# Patient Record
Sex: MALE | Race: WHITE | Employment: OTHER | URBAN - METROPOLITAN AREA
[De-identification: names, ages, dates, MRNs, and addresses within clinical notes are randomized per-mention and may not be internally consistent; named-entity substitution may affect disease eponyms.]

---

## 2017-05-16 ENCOUNTER — TELEPHONE (OUTPATIENT)
Dept: INTERNAL MEDICINE CLINIC | Facility: CLINIC | Age: 82
End: 2017-05-16

## 2017-05-16 NOTE — TELEPHONE ENCOUNTER
Pt calling to refill on below medicine below. Pt states he request refill and he do not go thru pharmacy. Please advise. Current outpatient prescriptions:   •  gabapentin 600 MG Oral Tab, Take 1 tablet (600 mg total) by mouth daily. , Disp: 90 tablet,

## 2017-05-23 RX ORDER — GABAPENTIN 600 MG/1
TABLET ORAL
Qty: 90 TABLET | Refills: 2 | Status: SHIPPED | OUTPATIENT
Start: 2017-05-23 | End: 2017-12-16

## 2017-06-22 ENCOUNTER — OFFICE VISIT (OUTPATIENT)
Dept: INTERNAL MEDICINE CLINIC | Facility: CLINIC | Age: 82
End: 2017-06-22

## 2017-06-22 ENCOUNTER — LAB ENCOUNTER (OUTPATIENT)
Dept: LAB | Facility: HOSPITAL | Age: 82
End: 2017-06-22
Attending: INTERNAL MEDICINE
Payer: MEDICARE

## 2017-06-22 ENCOUNTER — PRIOR ORIGINAL RECORDS (OUTPATIENT)
Dept: OTHER | Age: 82
End: 2017-06-22

## 2017-06-22 VITALS
HEART RATE: 60 BPM | DIASTOLIC BLOOD PRESSURE: 88 MMHG | BODY MASS INDEX: 26 KG/M2 | WEIGHT: 172 LBS | SYSTOLIC BLOOD PRESSURE: 128 MMHG | RESPIRATION RATE: 16 BRPM

## 2017-06-22 DIAGNOSIS — E78.2 MIXED HYPERLIPIDEMIA: ICD-10-CM

## 2017-06-22 DIAGNOSIS — R97.20 ELEVATED PSA: ICD-10-CM

## 2017-06-22 DIAGNOSIS — I10 ESSENTIAL HYPERTENSION: ICD-10-CM

## 2017-06-22 DIAGNOSIS — I10 ESSENTIAL HYPERTENSION: Primary | ICD-10-CM

## 2017-06-22 PROCEDURE — 80053 COMPREHEN METABOLIC PANEL: CPT

## 2017-06-22 PROCEDURE — 84153 ASSAY OF PSA TOTAL: CPT

## 2017-06-22 PROCEDURE — G0463 HOSPITAL OUTPT CLINIC VISIT: HCPCS | Performed by: INTERNAL MEDICINE

## 2017-06-22 PROCEDURE — 36415 COLL VENOUS BLD VENIPUNCTURE: CPT

## 2017-06-22 PROCEDURE — 99214 OFFICE O/P EST MOD 30 MIN: CPT | Performed by: INTERNAL MEDICINE

## 2017-06-22 PROCEDURE — 84443 ASSAY THYROID STIM HORMONE: CPT

## 2017-06-22 PROCEDURE — 80061 LIPID PANEL: CPT

## 2017-06-22 PROCEDURE — 85025 COMPLETE CBC W/AUTO DIFF WBC: CPT

## 2017-06-22 PROCEDURE — 81003 URINALYSIS AUTO W/O SCOPE: CPT

## 2017-06-22 NOTE — PROGRESS NOTES
Herb Mtz is a [de-identified]year old male. HPI:   1. Essential hypertension with goal blood pressure less than 140/90    Patient presents for recheck of his hypertension.  Pt has been taking medications as instructed, no medication side effects, home BP macho Rfl: 0   Cyanocobalamin (VITAMIN B-12 ER) 2000 MCG Oral Tab CR Take  by mouth. Disp:  Rfl:    Doxazosin Mesylate (CARDURA) 4 MG Oral Tab Take 4 mg by mouth. Disp:  Rfl:    aspirin 81 MG Oral Tab Take 81 mg by mouth daily.    Disp:  Rfl:    DiphenhydrAMINE Right     Comment Carpal Tunnel Surgery    OTHER Left     Comment Carpal Tunnel  Surgey      Social History:      Smoking Status: Never Smoker                      Smokeless Status: Never Used                        Alcohol Use: Yes           29.4 oz/week [E]; Future    3. Elevated PSA    Had a recent PSA that was 12.52 that had even been higher previously to 9.9 in May  of this year. Has been following this level for years. - PSA (Monitoring/Diagnostic) [E];  Future    The patient indicates understandin

## 2017-06-24 NOTE — PROGRESS NOTES
Per EMR PSA done 8-11-16 was 5.7 , now 11.4  Dr BOWLING, does pt need to see or f/u with urology? pls advise, thanks in advance.

## 2017-06-26 ENCOUNTER — TELEPHONE (OUTPATIENT)
Dept: INTERNAL MEDICINE CLINIC | Facility: CLINIC | Age: 82
End: 2017-06-26

## 2017-06-26 NOTE — TELEPHONE ENCOUNTER
----- Message from Gabriel Gomes MD sent at 6/25/2017  4:04 AM CDT -----  Patient has been seeing Dr Juancarlos Wright a urologist in [facility] for years and recently was evaluated by him.  Send patient value of PSA and he will contact Dr Nancy Tijerina regarding th

## 2017-06-27 NOTE — TELEPHONE ENCOUNTER
Patient aware of his results. Patient will contact and update Dr. Nancy Tijerina (URO). Does have an appt 7/25/17 to see Dr. Nancy Tijerina.

## 2017-08-01 ENCOUNTER — MYAURORA ACCOUNT LINK (OUTPATIENT)
Dept: OTHER | Age: 82
End: 2017-08-01

## 2017-08-01 ENCOUNTER — PRIOR ORIGINAL RECORDS (OUTPATIENT)
Dept: OTHER | Age: 82
End: 2017-08-01

## 2017-08-02 ENCOUNTER — PRIOR ORIGINAL RECORDS (OUTPATIENT)
Dept: OTHER | Age: 82
End: 2017-08-02

## 2017-08-04 ENCOUNTER — PRIOR ORIGINAL RECORDS (OUTPATIENT)
Dept: OTHER | Age: 82
End: 2017-08-04

## 2017-08-14 LAB
ALBUMIN: 3.9 G/DL
ALKALINE PHOSPHATATE(ALK PHOS): 65 IU/L
ALT (SGPT): 23 U/L
AST (SGOT): 20 U/L
BILIRUBIN TOTAL: 1.8 MG/DL
BUN: 16 MG/DL
CALCIUM: 9.3 MG/DL
CHLORIDE: 106 MEQ/L
CHOLESTEROL, TOTAL: 162 MG/DL
CREATININE, SERUM: 1.09 MG/DL
GLOBULIN: 2.4 G/DL
GLUCOSE: 104 MG/DL
GLUCOSE: 104 MG/DL
HDL CHOLESTEROL: 72 MG/DL
HEMATOCRIT: 40.8 %
HEMOGLOBIN: 13.6 G/DL
LDL CHOLESTEROL: 80 MG/DL
NON-HDL CHOLESTEROL: 90 MG/DL
PLATELETS: 188 K/UL
POTASSIUM, SERUM: 4.5 MEQ/L
PROTEIN, TOTAL: 6.3 G/DL
RED BLOOD COUNT: 4.33 X 10-6/U
SGOT (AST): 20 IU/L
SGPT (ALT): 23 IU/L
SODIUM: 141 MEQ/L
THYROID STIMULATING HORMONE: 1.58 MLU/L
TRIGLYCERIDES: 50 MG/DL
WHITE BLOOD COUNT: 3.6 X 10-3/U

## 2017-08-15 ENCOUNTER — PRIOR ORIGINAL RECORDS (OUTPATIENT)
Dept: OTHER | Age: 82
End: 2017-08-15

## 2017-09-14 ENCOUNTER — MED REC SCAN ONLY (OUTPATIENT)
Dept: INTERNAL MEDICINE CLINIC | Facility: CLINIC | Age: 82
End: 2017-09-14

## 2017-12-18 RX ORDER — GABAPENTIN 600 MG/1
TABLET ORAL
Qty: 90 TABLET | Refills: 0 | Status: SHIPPED | OUTPATIENT
Start: 2017-12-18 | End: 2018-05-08

## 2017-12-22 ENCOUNTER — HOSPITAL ENCOUNTER (OUTPATIENT)
Age: 82
Discharge: HOME | End: 2017-12-22
Attending: EMERGENCY MEDICINE
Payer: MEDICARE

## 2017-12-22 VITALS
DIASTOLIC BLOOD PRESSURE: 53 MMHG | SYSTOLIC BLOOD PRESSURE: 131 MMHG | BODY MASS INDEX: 25.01 KG/M2 | HEART RATE: 62 BPM | HEIGHT: 68 IN | OXYGEN SATURATION: 95 % | RESPIRATION RATE: 18 BRPM | WEIGHT: 165 LBS | TEMPERATURE: 98 F

## 2017-12-22 DIAGNOSIS — J40 BRONCHITIS: Primary | ICD-10-CM

## 2017-12-22 PROCEDURE — 99214 OFFICE O/P EST MOD 30 MIN: CPT

## 2017-12-22 PROCEDURE — 99213 OFFICE O/P EST LOW 20 MIN: CPT

## 2017-12-22 RX ORDER — AZITHROMYCIN 250 MG/1
TABLET, FILM COATED ORAL
Qty: 1 PACKAGE | Refills: 0 | Status: SHIPPED | OUTPATIENT
Start: 2017-12-22 | End: 2017-12-27

## 2017-12-22 NOTE — ED PROVIDER NOTES
Patient presents with:  Cough/URI      HPI:     Emory Kelly is a [de-identified]year old male who presents for evaluation of a chief complaint of  a cough Onset of symptoms was 4 days ago.  The patient also complains of none, the patient denies fever chest pain or b discussion with the patient he would prefer empiric antibiotic treatment and will defer on x-rays at this time      Orders Placed This Encounter      azithromycin (ZITHROMAX Z-KAM) 250 MG Oral Tab          Si mg once followed by 250 mg daily x 4 days

## 2018-05-09 RX ORDER — GABAPENTIN 600 MG/1
TABLET ORAL
Qty: 90 TABLET | Refills: 0 | Status: SHIPPED | OUTPATIENT
Start: 2018-05-09 | End: 2018-08-21

## 2018-06-08 ENCOUNTER — PATIENT MESSAGE (OUTPATIENT)
Dept: INTERNAL MEDICINE CLINIC | Facility: CLINIC | Age: 83
End: 2018-06-08

## 2018-06-19 DIAGNOSIS — Z12.5 SCREENING FOR PROSTATE CANCER: ICD-10-CM

## 2018-06-19 DIAGNOSIS — I10 ESSENTIAL HYPERTENSION WITH GOAL BLOOD PRESSURE LESS THAN 140/90: Primary | ICD-10-CM

## 2018-06-21 ENCOUNTER — HOSPITAL ENCOUNTER (EMERGENCY)
Facility: HOSPITAL | Age: 83
Discharge: HOME | End: 2018-06-21
Attending: EMERGENCY MEDICINE
Payer: MEDICARE

## 2018-06-21 VITALS
OXYGEN SATURATION: 100 % | TEMPERATURE: 97 F | HEART RATE: 64 BPM | BODY MASS INDEX: 25.76 KG/M2 | SYSTOLIC BLOOD PRESSURE: 180 MMHG | DIASTOLIC BLOOD PRESSURE: 90 MMHG | RESPIRATION RATE: 24 BRPM | HEIGHT: 68 IN | WEIGHT: 170 LBS

## 2018-06-21 DIAGNOSIS — Z46.6 ENCOUNTER FOR FOLEY CATHETER REMOVAL: Primary | ICD-10-CM

## 2018-06-21 PROCEDURE — 99282 EMERGENCY DEPT VISIT SF MDM: CPT

## 2018-06-21 NOTE — ED INITIAL ASSESSMENT (HPI)
Pt had chavarria catheter placed last week following biopsy but pt is having discomfort and would like the catheter removed.  Has appointment with doctor tomorrow but cannot wait

## 2018-06-22 ENCOUNTER — HOSPITAL ENCOUNTER (OUTPATIENT)
Dept: CT IMAGING | Facility: HOSPITAL | Age: 83
Discharge: HOME | End: 2018-06-22
Attending: UROLOGY
Payer: MEDICARE

## 2018-06-22 DIAGNOSIS — C61 PROSTATE CA (HCC): ICD-10-CM

## 2018-06-22 PROCEDURE — 74177 CT ABD & PELVIS W/CONTRAST: CPT | Performed by: UROLOGY

## 2018-06-26 ENCOUNTER — PRIOR ORIGINAL RECORDS (OUTPATIENT)
Dept: OTHER | Age: 83
End: 2018-06-26

## 2018-06-26 ENCOUNTER — HOSPITAL ENCOUNTER (OUTPATIENT)
Dept: NUCLEAR MEDICINE | Facility: HOSPITAL | Age: 83
Discharge: HOME | End: 2018-06-26
Attending: UROLOGY
Payer: MEDICARE

## 2018-06-26 ENCOUNTER — LAB ENCOUNTER (OUTPATIENT)
Dept: LAB | Facility: HOSPITAL | Age: 83
End: 2018-06-26
Attending: UROLOGY
Payer: MEDICARE

## 2018-06-26 DIAGNOSIS — I10 ESSENTIAL HYPERTENSION WITH GOAL BLOOD PRESSURE LESS THAN 140/90: ICD-10-CM

## 2018-06-26 DIAGNOSIS — C61 PROSTATE CA (HCC): ICD-10-CM

## 2018-06-26 PROCEDURE — 85025 COMPLETE CBC W/AUTO DIFF WBC: CPT

## 2018-06-26 PROCEDURE — 80053 COMPREHEN METABOLIC PANEL: CPT

## 2018-06-26 PROCEDURE — 78306 BONE IMAGING WHOLE BODY: CPT | Performed by: UROLOGY

## 2018-06-26 PROCEDURE — 84443 ASSAY THYROID STIM HORMONE: CPT

## 2018-06-26 PROCEDURE — 81001 URINALYSIS AUTO W/SCOPE: CPT

## 2018-06-26 PROCEDURE — 36415 COLL VENOUS BLD VENIPUNCTURE: CPT

## 2018-06-26 PROCEDURE — 80061 LIPID PANEL: CPT

## 2018-07-09 ENCOUNTER — OFFICE VISIT (OUTPATIENT)
Dept: INTERNAL MEDICINE CLINIC | Facility: CLINIC | Age: 83
End: 2018-07-09

## 2018-07-09 VITALS
HEIGHT: 68 IN | DIASTOLIC BLOOD PRESSURE: 81 MMHG | BODY MASS INDEX: 26.67 KG/M2 | SYSTOLIC BLOOD PRESSURE: 138 MMHG | HEART RATE: 76 BPM | RESPIRATION RATE: 16 BRPM | WEIGHT: 176 LBS

## 2018-07-09 DIAGNOSIS — E78.2 MIXED HYPERLIPIDEMIA: ICD-10-CM

## 2018-07-09 DIAGNOSIS — C61 PROSTATE CANCER (HCC): ICD-10-CM

## 2018-07-09 DIAGNOSIS — I10 ESSENTIAL HYPERTENSION WITH GOAL BLOOD PRESSURE LESS THAN 140/90: Primary | ICD-10-CM

## 2018-07-09 PROCEDURE — 99214 OFFICE O/P EST MOD 30 MIN: CPT | Performed by: INTERNAL MEDICINE

## 2018-07-09 PROCEDURE — G0463 HOSPITAL OUTPT CLINIC VISIT: HCPCS | Performed by: INTERNAL MEDICINE

## 2018-07-09 RX ORDER — METOPROLOL TARTRATE 50 MG/1
TABLET, FILM COATED ORAL
COMMUNITY
End: 2018-07-09

## 2018-07-09 RX ORDER — DOXAZOSIN MESYLATE 4 MG/1
TABLET ORAL
COMMUNITY
End: 2018-07-09

## 2018-07-09 RX ORDER — ATORVASTATIN CALCIUM 40 MG/1
TABLET, FILM COATED ORAL
COMMUNITY

## 2018-07-09 NOTE — PROGRESS NOTES
Sariah Barajas is a 82year old male. HPI:   1. Essential hypertension with goal blood pressure less than 140/90    Patient presents for recheck of his hypertension.  Pt has been taking medications as instructed, no medication side effects, home BP macho oral route. Disp:  Rfl:    GABAPENTIN 600 MG Oral Tab TAKE ONE TABLET BY MOUTH ONE TIME DAILY  Disp: 90 tablet Rfl: 0   DiphenhydrAMINE HCl (BENADRYL) 25 MG Oral Cap Take 25 mg by mouth every 6 (six) hours as needed for Itching.  Disp:  Rfl:    Metoprolol T Comment: Procedure: CARPAL TUNNEL RELEASE;  Surgeon:                Barbra Yee MD;  Location: [place] [address]  1/5/2016: REVISE MEDIAN N/CARPAL TUNNEL SURG Right      Comment: Procedure: CARPAL TUNNEL RELEASE;  Surgeon:                Barbra Yee MD; to exercise at least 3 times weekly to build strength, burn calories and help to achieve ideal body weight. 3. Prostate cancer [facility])    To see urology for injection starting tomorrow and every 3 months to help control cancer. Has been seeing Dr Joy Cannon.

## 2018-07-12 ENCOUNTER — HOSPITAL ENCOUNTER (OUTPATIENT)
Age: 83
Discharge: HOME | End: 2018-07-12
Attending: FAMILY MEDICINE
Payer: MEDICARE

## 2018-07-12 VITALS
BODY MASS INDEX: 25.76 KG/M2 | SYSTOLIC BLOOD PRESSURE: 143 MMHG | OXYGEN SATURATION: 100 % | RESPIRATION RATE: 18 BRPM | HEART RATE: 55 BPM | DIASTOLIC BLOOD PRESSURE: 59 MMHG | HEIGHT: 68 IN | WEIGHT: 170 LBS | TEMPERATURE: 98 F

## 2018-07-12 DIAGNOSIS — H61.21 IMPACTED CERUMEN OF RIGHT EAR: Primary | ICD-10-CM

## 2018-07-12 PROCEDURE — 99212 OFFICE O/P EST SF 10 MIN: CPT

## 2018-07-12 PROCEDURE — 69209 REMOVE IMPACTED EAR WAX UNI: CPT

## 2018-07-12 PROCEDURE — 99213 OFFICE O/P EST LOW 20 MIN: CPT

## 2018-07-12 NOTE — ED PROVIDER NOTES
Patient Seen in: [address] [facility]    History   Patient presents with:  Ear Problem    Stated Complaint: CLOGGED EARS     HPI    R ear clogged   Went to see audiologist who recommended pt get his ear cleaned.    Notes slight decr Location: [facility]    Family history reviewed and is not pertinent to presenting problem.     Smoking status: Never Smoker                                                              Smokeless tobacco: Never Used                      Alcohol use: Yes

## 2018-07-12 NOTE — ED INITIAL ASSESSMENT (HPI)
PATIENT ARRIVED AMBULATORY TO ROOM C/O MUFFLED HEARING TO THE RIGHT EAR. PATIENT STATES \"i THINK THERE IS WAX BUILD UP\" NO PAIN. NO FEVERS. NO NASAL CONGESTION/COUGH.

## 2018-08-03 LAB
ALBUMIN: 3.8 G/DL
ALKALINE PHOSPHATATE(ALK PHOS): 85 IU/L
ALT (SGPT): 24 U/L
AST (SGOT): 26 U/L
BILIRUBIN TOTAL: 1.3 MG/DL
BUN: 14 MG/DL
CALCIUM: 9.3 MG/DL
CHLORIDE: 106 MEQ/L
CHOLESTEROL, TOTAL: 159 MG/DL
CREATININE, SERUM: 1.14 MG/DL
GLOBULIN: 2.5 G/DL
GLUCOSE: 102 MG/DL
GLUCOSE: 102 MG/DL
HDL CHOLESTEROL: 68 MG/DL
HEMATOCRIT: 39.2 %
HEMOGLOBIN: 13.3 G/DL
LDL CHOLESTEROL: 82 MG/DL
NON-HDL CHOLESTEROL: 91 MG/DL
PLATELETS: 199 K/UL
POTASSIUM, SERUM: 4.4 MEQ/L
PROTEIN, TOTAL: 6.3 G/DL
RED BLOOD COUNT: 4.15 X 10-6/U
SGOT (AST): 26 IU/L
SGPT (ALT): 24 IU/L
SODIUM: 140 MEQ/L
THYROID STIMULATING HORMONE: 0.96 MLU/L
TRIGLYCERIDES: 46 MG/DL
WHITE BLOOD COUNT: 3.6 X 10-3/U

## 2018-08-07 ENCOUNTER — PRIOR ORIGINAL RECORDS (OUTPATIENT)
Dept: OTHER | Age: 83
End: 2018-08-07

## 2018-08-07 ENCOUNTER — MYAURORA ACCOUNT LINK (OUTPATIENT)
Dept: OTHER | Age: 83
End: 2018-08-07

## 2018-08-14 ENCOUNTER — TELEPHONE (OUTPATIENT)
Dept: INTERNAL MEDICINE CLINIC | Facility: CLINIC | Age: 83
End: 2018-08-14

## 2018-08-14 NOTE — TELEPHONE ENCOUNTER
Pharmacy requesting written rx for [facility] (Zoster Vaccine - Recombinant, Adjuvanted)? .     Patient on chemo and needs written RX.        Please advise

## 2018-08-16 NOTE — TELEPHONE ENCOUNTER
Looked it  up and it is contraindicated to get the shingrex  vaccine when on chemo he will need to get the prescription from his oncologist or  PCP after he is done with chemo

## 2018-08-17 ENCOUNTER — HOSPITAL ENCOUNTER (OUTPATIENT)
Age: 83
Discharge: HOME | End: 2018-08-17
Attending: EMERGENCY MEDICINE
Payer: MEDICARE

## 2018-08-17 ENCOUNTER — APPOINTMENT (OUTPATIENT)
Dept: GENERAL RADIOLOGY | Age: 83
End: 2018-08-17
Attending: EMERGENCY MEDICINE
Payer: MEDICARE

## 2018-08-17 VITALS
HEIGHT: 68 IN | TEMPERATURE: 98 F | RESPIRATION RATE: 18 BRPM | HEART RATE: 58 BPM | OXYGEN SATURATION: 97 % | DIASTOLIC BLOOD PRESSURE: 48 MMHG | WEIGHT: 170 LBS | SYSTOLIC BLOOD PRESSURE: 134 MMHG | BODY MASS INDEX: 25.76 KG/M2

## 2018-08-17 DIAGNOSIS — S96.911A RIGHT FOOT STRAIN, INITIAL ENCOUNTER: Primary | ICD-10-CM

## 2018-08-17 PROCEDURE — 73630 X-RAY EXAM OF FOOT: CPT | Performed by: EMERGENCY MEDICINE

## 2018-08-17 PROCEDURE — 99213 OFFICE O/P EST LOW 20 MIN: CPT

## 2018-08-17 NOTE — ED NOTES
Patient presents with c/o right foot pain that started 1 week ago and seems to be getting worse. Patient denies any fall or injury but thinks he may have twisted while walking. No bruising or deformity noted.  Patient had an ace wrap in place upon arrival.

## 2018-08-17 NOTE — ED PROVIDER NOTES
Patient Seen in: [facility]    History   Patient presents with:  Lower Extremity Injury (musculoskeletal)    Stated Complaint: Foot Pain    HPI  Complains of pain in the right foot.   Predominantly on the top and towards t Location: [facility]  12/23/2015: PATIENT Whitley Clare PREOPERATIVE ORDER FOR IV ANT*      Comment: Procedure: ;  Surgeon: Pippa Ferreira MD;                 Location: [facility]  1/5/2016: PATIENT Whitley Sparks PREOPERATIVE ORDER FOR IV ANT* Cardiovascular: Normal rate, regular rhythm, normal heart sounds and intact distal pulses. Pulmonary/Chest: Effort normal and breath sounds normal.   Abdominal: Soft. There is no tenderness. Musculoskeletal: Normal range of motion.  He exhibits no christophe Results the x-ray were discussed with the patient. Follow-up, return and home management were reviewed. He states his primary care doctor is out of town currently.   He was given the name of a podiatrist as an option for follow-up as well as following up

## 2018-08-21 ENCOUNTER — OFFICE VISIT (OUTPATIENT)
Dept: PODIATRY CLINIC | Facility: CLINIC | Age: 83
End: 2018-08-21
Payer: MEDICARE

## 2018-08-21 DIAGNOSIS — M79.671 RIGHT FOOT PAIN: Primary | ICD-10-CM

## 2018-08-21 PROCEDURE — 99203 OFFICE O/P NEW LOW 30 MIN: CPT | Performed by: PODIATRIST

## 2018-08-21 PROCEDURE — G0463 HOSPITAL OUTPT CLINIC VISIT: HCPCS | Performed by: PODIATRIST

## 2018-08-21 RX ORDER — GABAPENTIN 600 MG/1
TABLET ORAL
Qty: 90 TABLET | Refills: 0 | Status: SHIPPED | OUTPATIENT
Start: 2018-08-21 | End: 2018-10-30

## 2018-08-21 NOTE — TELEPHONE ENCOUNTER
Neurology Medications Passed8/21 1:07 PM   Appointment in the past 6 or next 3 months     Medication refilled for 90 days per protocol.

## 2018-08-21 NOTE — PROGRESS NOTES
HPI:    Patient ID: Andre Faith is a 82year old male. HPI  This pleasant 37-year-old male presents as a new patient to me on referral from the urgent care center.   Patient states for the last 10 days he has had frustrating pain associated with the r There is no evidence of neurologic deficit there is no apparent weakness. I did review x-ray and there was some question in reference to the first metatarsophalangeal joint.   Patient states that he has never had gout and presently has no symptoms around t

## 2018-08-22 ENCOUNTER — TELEPHONE (OUTPATIENT)
Dept: INTERNAL MEDICINE CLINIC | Facility: CLINIC | Age: 83
End: 2018-08-22

## 2018-08-22 NOTE — TELEPHONE ENCOUNTER
Pt called in requesting to speak with you directly in regards to receiving the Shingrix vaccination.

## 2018-10-31 RX ORDER — GABAPENTIN 600 MG/1
TABLET ORAL
Qty: 90 TABLET | Refills: 0 | Status: SHIPPED | OUTPATIENT
Start: 2018-10-31 | End: 2018-12-11

## 2018-11-01 NOTE — TELEPHONE ENCOUNTER
Refill passed per [facility], [facility] protocol.     Refill Protocol Appointment Criteria  · Appointment scheduled in the past 6 months or in the next 3 months  Recent Outpatient Visits            2 months ago Right foot pain    [facility]

## 2018-11-19 ENCOUNTER — OFFICE VISIT (OUTPATIENT)
Dept: INTERNAL MEDICINE CLINIC | Facility: CLINIC | Age: 83
End: 2018-11-19
Payer: MEDICARE

## 2018-11-19 VITALS
DIASTOLIC BLOOD PRESSURE: 74 MMHG | BODY MASS INDEX: 26.37 KG/M2 | HEART RATE: 56 BPM | RESPIRATION RATE: 16 BRPM | SYSTOLIC BLOOD PRESSURE: 136 MMHG | HEIGHT: 68 IN | WEIGHT: 174 LBS

## 2018-11-19 DIAGNOSIS — E78.2 MIXED HYPERLIPIDEMIA: ICD-10-CM

## 2018-11-19 DIAGNOSIS — C61 PROSTATE CANCER (HCC): ICD-10-CM

## 2018-11-19 DIAGNOSIS — I10 ESSENTIAL HYPERTENSION WITH GOAL BLOOD PRESSURE LESS THAN 140/90: Primary | ICD-10-CM

## 2018-11-19 PROCEDURE — G0463 HOSPITAL OUTPT CLINIC VISIT: HCPCS | Performed by: INTERNAL MEDICINE

## 2018-11-19 PROCEDURE — 99214 OFFICE O/P EST MOD 30 MIN: CPT | Performed by: INTERNAL MEDICINE

## 2018-12-11 RX ORDER — GABAPENTIN 600 MG/1
TABLET ORAL
Qty: 90 TABLET | Refills: 0 | Status: SHIPPED | OUTPATIENT
Start: 2018-12-11 | End: 2019-04-24

## 2018-12-12 NOTE — TELEPHONE ENCOUNTER
Refill passed per [address] Olga Vickers protocol.   Refill Protocol Appointment Criteria  · Appointment scheduled in the past 6 months or in the next 3 months  Recent Outpatient Visits            3 weeks ago Essential hypertension with goal blood pressure less th

## 2018-12-15 ENCOUNTER — TELEPHONE (OUTPATIENT)
Dept: INTERNAL MEDICINE CLINIC | Facility: CLINIC | Age: 83
End: 2018-12-15

## 2018-12-15 NOTE — TELEPHONE ENCOUNTER
Pt requesting orders for Shingrix vaccination be sent to [facility] in [place]. Please call back with confirmation once sent.

## 2018-12-19 ENCOUNTER — HOSPITAL ENCOUNTER (OUTPATIENT)
Age: 83
Discharge: HOME | End: 2018-12-19
Attending: EMERGENCY MEDICINE
Payer: MEDICARE

## 2018-12-19 VITALS
WEIGHT: 170 LBS | OXYGEN SATURATION: 97 % | BODY MASS INDEX: 25.76 KG/M2 | SYSTOLIC BLOOD PRESSURE: 110 MMHG | HEIGHT: 68 IN | HEART RATE: 66 BPM | TEMPERATURE: 98 F | RESPIRATION RATE: 18 BRPM | DIASTOLIC BLOOD PRESSURE: 71 MMHG

## 2018-12-19 DIAGNOSIS — H61.22 IMPACTED CERUMEN OF LEFT EAR: Primary | ICD-10-CM

## 2018-12-19 PROCEDURE — 99214 OFFICE O/P EST MOD 30 MIN: CPT

## 2018-12-19 PROCEDURE — 69209 REMOVE IMPACTED EAR WAX UNI: CPT

## 2018-12-19 PROCEDURE — 99213 OFFICE O/P EST LOW 20 MIN: CPT

## 2018-12-19 RX ORDER — NEOMYCIN SULFATE, POLYMYXIN B SULFATE AND HYDROCORTISONE 10; 3.5; 1 MG/ML; MG/ML; [USP'U]/ML
1 SUSPENSION/ DROPS AURICULAR (OTIC) 4 TIMES DAILY
Qty: 1 BOTTLE | Refills: 0 | Status: SHIPPED | OUTPATIENT
Start: 2018-12-19 | End: 2019-05-20

## 2018-12-19 NOTE — TELEPHONE ENCOUNTER
[facility] in [facility] calling in stating that they did not receive the order for the vaccine and Pt is currently waiting at the pharmacy. He was told it was faxed over on Monday. Pharmacy confirmed fax as #[phone]    Please advise.

## 2018-12-20 NOTE — ED PROVIDER NOTES
Patient Seen in: [address]    History   Patient presents with:  Ear Problem Pain (neurosensory)    Stated Complaint: right ear pain    HPI    Patient complains of right hearing loss.   Patient states that he wears hearin 1 tablet by mouth daily. Cyanocobalamin (VITAMIN B-12 ER) 2000 MCG Oral Tab CR,  Take  by mouth. Doxazosin Mesylate (CARDURA) 4 MG Oral Tab,  Take 4 mg by mouth. aspirin 81 MG Oral Tab,  Take 81 mg by mouth daily.          Family History   Problem R rashes  PSYCH: calm, cooperative,    Differential includes:    ED Course   Labs Reviewed - No data to display    MDM   After ear irrigation bilateral ears are free of cerumen, both ear canals are red and inflamed we will give him antibiotic treatment for

## 2018-12-20 NOTE — ED INITIAL ASSESSMENT (HPI)
Pt presents to the IC with c/o right ear discomfort. Pt denies fevers. Notes dark discharge from his ears this morning when trying to place his hearing aids this morning.

## 2018-12-27 ENCOUNTER — OFFICE VISIT (OUTPATIENT)
Dept: OTOLARYNGOLOGY | Facility: CLINIC | Age: 83
End: 2018-12-27
Payer: MEDICARE

## 2018-12-27 VITALS — SYSTOLIC BLOOD PRESSURE: 127 MMHG | HEART RATE: 68 BPM | TEMPERATURE: 98 F | DIASTOLIC BLOOD PRESSURE: 70 MMHG

## 2018-12-27 DIAGNOSIS — H60.501 ACUTE OTITIS EXTERNA OF RIGHT EAR, UNSPECIFIED TYPE: Primary | ICD-10-CM

## 2018-12-27 PROCEDURE — 92504 EAR MICROSCOPY EXAMINATION: CPT | Performed by: OTOLARYNGOLOGY

## 2018-12-27 PROCEDURE — 99203 OFFICE O/P NEW LOW 30 MIN: CPT | Performed by: OTOLARYNGOLOGY

## 2018-12-27 PROCEDURE — G0463 HOSPITAL OUTPT CLINIC VISIT: HCPCS | Performed by: OTOLARYNGOLOGY

## 2018-12-27 NOTE — PROGRESS NOTES
Aníbal Trevizo is a 82year old male.  Patient presents with:  Hearing Loss: Patient states that both of his ears are clogged x 1 week - black stuff coming out of ear      HISTORY OF PRESENT ILLNESS  12/27/2018  Patient  presents with ear pain in the right Asked        Caffeine Concern: Yes          2-3 cup of coffee daily        Occupational Exposure: Not Asked        Hobby Hazards: Not Asked        Sleep Concern: Not Asked        Stress Concern: Not Asked        Weight Concern: Not Asked        Special Die intolerance. Neuro Negative Tremors. Psych Negative Behavioral issues   Integumentary Negative Frequent skin infections, pigment change and rash. Hema/Lymph Negative Easy bleeding and easy bruising.      PHYSICAL EXAM    /70   Pulse 68   Temp 97 Take 1 tablet by mouth daily. , Disp: 90 tablet, Rfl: 0  •  Cyanocobalamin (VITAMIN B-12 ER) 2000 MCG Oral Tab CR, Take  by mouth., Disp: , Rfl:   •  Doxazosin Mesylate (CARDURA) 4 MG Oral Tab, Take 4 mg by mouth.  , Disp: , Rfl:   •  aspirin 81 MG Oral Tab

## 2018-12-31 ENCOUNTER — OFFICE VISIT (OUTPATIENT)
Dept: OTOLARYNGOLOGY | Facility: CLINIC | Age: 83
End: 2018-12-31
Payer: MEDICARE

## 2018-12-31 VITALS
BODY MASS INDEX: 26.37 KG/M2 | WEIGHT: 174 LBS | HEIGHT: 68 IN | DIASTOLIC BLOOD PRESSURE: 63 MMHG | TEMPERATURE: 98 F | SYSTOLIC BLOOD PRESSURE: 102 MMHG

## 2018-12-31 DIAGNOSIS — H60.501 ACUTE OTITIS EXTERNA OF RIGHT EAR, UNSPECIFIED TYPE: Primary | ICD-10-CM

## 2018-12-31 PROCEDURE — G0463 HOSPITAL OUTPT CLINIC VISIT: HCPCS | Performed by: OTOLARYNGOLOGY

## 2018-12-31 PROCEDURE — 92504 EAR MICROSCOPY EXAMINATION: CPT | Performed by: OTOLARYNGOLOGY

## 2018-12-31 PROCEDURE — 99213 OFFICE O/P EST LOW 20 MIN: CPT | Performed by: OTOLARYNGOLOGY

## 2018-12-31 NOTE — PROGRESS NOTES
Andre Faith is a 82year old male. Patient presents with:   Follow - Up: 1 week follow up- acute otitis externa of right ear- per pt there is some changes/improvement of symptoms      HISTORY OF PRESENT ILLNESS  12/27/2018  Patient  presents with ear bill Concerns:         Service: Not Asked        Blood Transfusions: Not Asked        Caffeine Concern: Yes          2-3 cup of coffee daily        Occupational Exposure: Not Asked        Hobby Hazards: Not Asked        Sleep Concern: Not Asked        S Abdominal pain and diarrhea. Endocrine Negative Cold intolerance and heat intolerance. Neuro Negative Tremors. Psych Negative Behavioral issues   Integumentary Negative Frequent skin infections, pigment change and rash.    Hema/Lymph Negative Easy ble (six) hours as needed for Itching., Disp: , Rfl:   •  Metoprolol Tartrate (LOPRESSOR) 50 MG Oral Tab, Take 1 tablet by mouth daily. , Disp: 90 tablet, Rfl: 0  •  Cyanocobalamin (VITAMIN B-12 ER) 2000 MCG Oral Tab CR, Take  by mouth., Disp: , Rfl:   •  Doxaz

## 2019-02-28 VITALS
SYSTOLIC BLOOD PRESSURE: 130 MMHG | OXYGEN SATURATION: 96 % | HEIGHT: 70 IN | WEIGHT: 174 LBS | DIASTOLIC BLOOD PRESSURE: 68 MMHG | BODY MASS INDEX: 24.91 KG/M2 | HEART RATE: 59 BPM

## 2019-02-28 VITALS
DIASTOLIC BLOOD PRESSURE: 70 MMHG | WEIGHT: 168 LBS | RESPIRATION RATE: 18 BRPM | OXYGEN SATURATION: 98 % | BODY MASS INDEX: 25.46 KG/M2 | HEART RATE: 55 BPM | HEIGHT: 68 IN | SYSTOLIC BLOOD PRESSURE: 120 MMHG

## 2019-04-10 RX ORDER — GUAIFENESIN 400 MG
TABLET ORAL
COMMUNITY

## 2019-04-10 RX ORDER — METOPROLOL SUCCINATE 50 MG/1
TABLET, EXTENDED RELEASE ORAL
COMMUNITY
End: 2019-08-20 | Stop reason: SDUPTHER

## 2019-04-10 RX ORDER — ATORVASTATIN CALCIUM 40 MG/1
TABLET, FILM COATED ORAL
COMMUNITY
End: 2019-08-20 | Stop reason: SDUPTHER

## 2019-04-10 RX ORDER — DOXAZOSIN MESYLATE 4 MG/1
TABLET ORAL
COMMUNITY

## 2019-04-10 RX ORDER — NAPROXEN SODIUM 220 MG
TABLET ORAL
COMMUNITY

## 2019-04-10 RX ORDER — GABAPENTIN 600 MG/1
600 TABLET ORAL 4 TIMES DAILY
COMMUNITY

## 2019-04-25 RX ORDER — GABAPENTIN 600 MG/1
TABLET ORAL
Qty: 90 TABLET | Refills: 1 | Status: SHIPPED | OUTPATIENT
Start: 2019-04-25 | End: 2019-10-03

## 2019-04-25 NOTE — TELEPHONE ENCOUNTER
Refill passed per [facility], [facility] protocol.   Refill Protocol Appointment Criteria  · Appointment scheduled in the past 6 months or in the next 3 months  Recent Outpatient Visits            3 months ago Acute otitis externa of right ear, unspecified type

## 2019-05-20 ENCOUNTER — OFFICE VISIT (OUTPATIENT)
Dept: INTERNAL MEDICINE CLINIC | Facility: CLINIC | Age: 84
End: 2019-05-20
Payer: MEDICARE

## 2019-05-20 VITALS
WEIGHT: 180 LBS | BODY MASS INDEX: 27.28 KG/M2 | SYSTOLIC BLOOD PRESSURE: 138 MMHG | HEART RATE: 76 BPM | HEIGHT: 68 IN | DIASTOLIC BLOOD PRESSURE: 82 MMHG | RESPIRATION RATE: 16 BRPM

## 2019-05-20 DIAGNOSIS — Z23 NEED FOR VACCINATION: ICD-10-CM

## 2019-05-20 DIAGNOSIS — E78.2 MIXED HYPERLIPIDEMIA: ICD-10-CM

## 2019-05-20 DIAGNOSIS — I10 ESSENTIAL HYPERTENSION WITH GOAL BLOOD PRESSURE LESS THAN 140/90: Primary | ICD-10-CM

## 2019-05-20 DIAGNOSIS — C61 PROSTATE CANCER (HCC): ICD-10-CM

## 2019-05-20 PROCEDURE — G0009 ADMIN PNEUMOCOCCAL VACCINE: HCPCS | Performed by: INTERNAL MEDICINE

## 2019-05-20 PROCEDURE — 99214 OFFICE O/P EST MOD 30 MIN: CPT | Performed by: INTERNAL MEDICINE

## 2019-05-20 PROCEDURE — 90670 PCV13 VACCINE IM: CPT | Performed by: INTERNAL MEDICINE

## 2019-05-20 PROCEDURE — G0463 HOSPITAL OUTPT CLINIC VISIT: HCPCS | Performed by: INTERNAL MEDICINE

## 2019-05-20 NOTE — PROGRESS NOTES
Faraz Finney is a 82year old male. HPI:   1. Essential hypertension with goal blood pressure less than 140/90    Patient presents for recheck of his hypertension.  Pt has been taking medications as instructed, no medication side effects, home BP macho into the skin. Disp:  Rfl:    atorvastatin 40 MG Oral Tab atorvastatin 40 mg tablet Take 1 tablet every day by oral route. Disp:  Rfl:    DiphenhydrAMINE HCl (BENADRYL) 25 MG Oral Cap Take 25 mg by mouth every 6 (six) hours as needed for Itching.  Disp:  Rf chest pain on exertion  GI: denies abdominal pain and denies heartburn  NEURO: denies headaches    EXAM:   /82   Pulse 76   Resp 16   Ht 5' 8\" (1.727 m)   Wt 180 lb (81.6 kg)   BMI 27.37 kg/m²   GENERAL: well developed, well nourished,in no apparent

## 2019-07-10 ENCOUNTER — MED REC SCAN ONLY (OUTPATIENT)
Dept: INTERNAL MEDICINE CLINIC | Facility: CLINIC | Age: 84
End: 2019-07-10

## 2019-08-19 PROBLEM — C61 PROSTATE CANCER (CMD): Status: ACTIVE | Noted: 2019-08-19

## 2019-08-19 PROBLEM — E78.2 HYPERLIPIDEMIA, MIXED: Status: ACTIVE | Noted: 2019-08-19

## 2019-08-19 PROBLEM — I10 ESSENTIAL HYPERTENSION WITH GOAL BLOOD PRESSURE LESS THAN 140/90: Status: ACTIVE | Noted: 2019-08-19

## 2019-08-19 PROBLEM — R97.20 ELEVATED PSA: Status: ACTIVE | Noted: 2019-08-19

## 2019-08-19 PROBLEM — D72.819 LEUKOPENIA: Status: ACTIVE | Noted: 2019-08-19

## 2019-08-20 ENCOUNTER — OFFICE VISIT (OUTPATIENT)
Dept: CARDIOLOGY | Age: 84
End: 2019-08-20

## 2019-08-20 VITALS
OXYGEN SATURATION: 97 % | SYSTOLIC BLOOD PRESSURE: 130 MMHG | DIASTOLIC BLOOD PRESSURE: 60 MMHG | HEIGHT: 68 IN | HEART RATE: 56 BPM | WEIGHT: 176 LBS | BODY MASS INDEX: 26.67 KG/M2

## 2019-08-20 DIAGNOSIS — E78.2 HYPERLIPIDEMIA, MIXED: ICD-10-CM

## 2019-08-20 DIAGNOSIS — I25.10 CORONARY ARTERY DISEASE INVOLVING NATIVE CORONARY ARTERY OF NATIVE HEART WITHOUT ANGINA PECTORIS: Primary | ICD-10-CM

## 2019-08-20 PROCEDURE — 99214 OFFICE O/P EST MOD 30 MIN: CPT | Performed by: INTERNAL MEDICINE

## 2019-08-20 RX ORDER — ATORVASTATIN CALCIUM 40 MG/1
40 TABLET, FILM COATED ORAL DAILY
Qty: 90 TABLET | Refills: 3 | Status: SHIPPED | OUTPATIENT
Start: 2019-08-20 | End: 2020-09-28

## 2019-08-20 RX ORDER — METOPROLOL SUCCINATE 50 MG/1
50 TABLET, EXTENDED RELEASE ORAL DAILY
Qty: 90 TABLET | Refills: 3 | Status: SHIPPED | OUTPATIENT
Start: 2019-08-20 | End: 2020-09-28

## 2019-08-22 ENCOUNTER — LAB ENCOUNTER (OUTPATIENT)
Dept: LAB | Facility: HOSPITAL | Age: 84
End: 2019-08-22
Attending: INTERNAL MEDICINE
Payer: MEDICARE

## 2019-08-22 DIAGNOSIS — I25.10 CORONARY ARTERY DISEASE INVOLVING NATIVE CORONARY ARTERY WITHOUT ANGINA PECTORIS: ICD-10-CM

## 2019-08-22 DIAGNOSIS — I25.10 CORONARY ATHEROSCLEROSIS OF NATIVE CORONARY ARTERY: Primary | ICD-10-CM

## 2019-08-22 LAB
ABSOLUTE IMMATURE GRANULOCYTES (OFFPRE24): NORMAL
ALBUMIN SERPL-MCNC: 3.7 G/DL
ALBUMIN SERPL-MCNC: 3.7 G/DL (ref 3.4–5)
ALBUMIN/GLOB SERPL: 1.4 {RATIO} (ref 1–2)
ALBUMIN/GLOB SERPL: NORMAL {RATIO}
ALP LIVER SERPL-CCNC: 77 U/L (ref 45–117)
ALP SERPL-CCNC: 77 U/L
ALT SERPL-CCNC: 18 U/L
ALT SERPL-CCNC: 18 U/L (ref 16–61)
ANION GAP SERPL CALC-SCNC: 10 MMOL/L (ref 0–18)
ANION GAP SERPL CALC-SCNC: NORMAL MMOL/L
AST SERPL-CCNC: 23 U/L
AST SERPL-CCNC: 23 U/L (ref 15–37)
BASO+EOS+MONOS # BLD: NORMAL 10*3/UL
BASO+EOS+MONOS NFR BLD: NORMAL %
BASOPHILS # BLD AUTO: 0.02 X10(3) UL (ref 0–0.2)
BASOPHILS # BLD: NORMAL 10*3/UL
BASOPHILS NFR BLD AUTO: 0.8 %
BASOPHILS NFR BLD: NORMAL %
BILIRUB SERPL-MCNC: 1.3 MG/DL
BILIRUB SERPL-MCNC: 1.3 MG/DL (ref 0.1–2)
BUN BLD-MCNC: 19 MG/DL (ref 7–18)
BUN SERPL-MCNC: 19 MG/DL
BUN/CREAT SERPL: 16 (ref 10–20)
BUN/CREAT SERPL: NORMAL
CALCIUM BLD-MCNC: 9.1 MG/DL (ref 8.5–10.1)
CALCIUM SERPL-MCNC: 9.1 MG/DL
CHLORIDE SERPL-SCNC: 110 MMOL/L
CHLORIDE SERPL-SCNC: 110 MMOL/L (ref 98–112)
CHOLEST SERPL-MCNC: 156 MG/DL
CHOLEST SERPL-MCNC: 156 MG/DL
CHOLEST SMN-MCNC: 156 MG/DL (ref ?–200)
CHOLEST/HDLC SERPL: NORMAL {RATIO}
CK SERPL-CCNC: 180 U/L
CK SERPL-CCNC: 180 U/L (ref 39–308)
CO2 SERPL-SCNC: 26 MMOL/L (ref 21–32)
CO2 SERPL-SCNC: NORMAL MMOL/L
CREAT BLD-MCNC: 1.19 MG/DL (ref 0.7–1.3)
CREAT SERPL-MCNC: 1.19 MG/DL
DEPRECATED RDW RBC AUTO: 49.4 FL (ref 35.1–46.3)
DIFFERENTIAL METHOD BLD: NORMAL
EOSINOPHIL # BLD AUTO: 0.53 X10(3) UL (ref 0–0.7)
EOSINOPHIL # BLD: NORMAL 10*3/UL
EOSINOPHIL NFR BLD AUTO: 20.4 %
EOSINOPHIL NFR BLD: NORMAL %
ERYTHROCYTE [DISTWIDTH] IN BLOOD BY AUTOMATED COUNT: 13.7 % (ref 11–15)
ERYTHROCYTE [DISTWIDTH] IN BLOOD: NORMAL %
GLOBULIN PLAS-MCNC: 2.7 G/DL (ref 2.8–4.4)
GLOBULIN SER-MCNC: 2.7 G/DL
GLUCOSE BLD-MCNC: 98 MG/DL (ref 70–99)
GLUCOSE SERPL-MCNC: 98 MG/DL
HCT VFR BLD AUTO: 34.4 % (ref 39–53)
HCT VFR BLD CALC: 34.4 %
HDLC SERPL-MCNC: 83 MG/DL
HDLC SERPL-MCNC: 83 MG/DL
HDLC SERPL-MCNC: 83 MG/DL (ref 40–59)
HGB BLD-MCNC: 11.2 G/DL
HGB BLD-MCNC: 11.2 G/DL (ref 13–17.5)
IMM GRANULOCYTES # BLD AUTO: 0 X10(3) UL (ref 0–1)
IMM GRANULOCYTES NFR BLD: 0 %
IMMATURE GRANULOCYTES (OFFPRE25): NORMAL
LDLC SERPL CALC-MCNC: 63 MG/DL
LDLC SERPL CALC-MCNC: 63 MG/DL
LDLC SERPL CALC-MCNC: 63 MG/DL (ref ?–100)
LENGTH OF FAST TIME PATIENT: NORMAL H
LENGTH OF FAST TIME PATIENT: NORMAL H
LYMPHOCYTES # BLD AUTO: 0.36 X10(3) UL (ref 1–4)
LYMPHOCYTES # BLD: NORMAL 10*3/UL
LYMPHOCYTES NFR BLD AUTO: 13.8 %
LYMPHOCYTES NFR BLD: NORMAL %
M PROTEIN MFR SERPL ELPH: 6.4 G/DL (ref 6.4–8.2)
MCH RBC QN AUTO: 32 PG (ref 26–34)
MCH RBC QN AUTO: NORMAL PG
MCHC RBC AUTO-ENTMCNC: 32.6 G/DL (ref 31–37)
MCHC RBC AUTO-ENTMCNC: NORMAL G/DL
MCV RBC AUTO: 98.3 FL (ref 80–100)
MCV RBC AUTO: NORMAL FL
MONOCYTES # BLD AUTO: 0.27 X10(3) UL (ref 0.1–1)
MONOCYTES # BLD: NORMAL 10*3/UL
MONOCYTES NFR BLD AUTO: 10.4 %
MONOCYTES NFR BLD: NORMAL %
MPV (OFFPRE2): NORMAL
NEUTROPHILS # BLD AUTO: 1.42 X10 (3) UL (ref 1.5–7.7)
NEUTROPHILS # BLD AUTO: 1.42 X10(3) UL (ref 1.5–7.7)
NEUTROPHILS # BLD: NORMAL 10*3/UL
NEUTROPHILS NFR BLD AUTO: 54.6 %
NEUTROPHILS NFR BLD: NORMAL %
NONHDLC SERPL-MCNC: 73 MG/DL
NONHDLC SERPL-MCNC: 73 MG/DL (ref ?–130)
NONHDLC SERPL-MCNC: NORMAL MG/DL
NRBC BLD MANUAL-RTO: NORMAL %
OSMOLALITY SERPL CALC.SUM OF ELEC: 304 MOSM/KG (ref 275–295)
PATIENT FASTING: YES
PATIENT FASTING: YES
PLAT MORPH BLD: NORMAL
PLATELET # BLD AUTO: 150 10(3)UL (ref 150–450)
PLATELET # BLD: 150 10*3/UL
POTASSIUM SERPL-SCNC: 4.5 MMOL/L
POTASSIUM SERPL-SCNC: 4.5 MMOL/L (ref 3.5–5.1)
PROT SERPL-MCNC: 6.4 G/DL
RBC # BLD AUTO: 3.5 X10(6)UL (ref 3.8–5.8)
RBC # BLD: 3.5 10*6/UL
RBC MORPH BLD: NORMAL
SODIUM SERPL-SCNC: 146 MMOL/L
SODIUM SERPL-SCNC: 146 MMOL/L (ref 136–145)
TRIGL SERPL-MCNC: 48 MG/DL
TRIGL SERPL-MCNC: 48 MG/DL
TRIGL SERPL-MCNC: 48 MG/DL (ref 30–149)
TSH SERPL-ACNC: 1.93 M[IU]/L
TSI SER-ACNC: 1.93 MIU/ML (ref 0.36–3.74)
VLDLC SERPL CALC-MCNC: 10 MG/DL
VLDLC SERPL CALC-MCNC: 10 MG/DL (ref 0–30)
VLDLC SERPL CALC-MCNC: NORMAL MG/DL
WBC # BLD AUTO: 2.6 X10(3) UL (ref 4–11)
WBC # BLD: 2.6 10*3/UL
WBC MORPH BLD: NORMAL

## 2019-08-22 PROCEDURE — 80061 LIPID PANEL: CPT

## 2019-08-22 PROCEDURE — 36415 COLL VENOUS BLD VENIPUNCTURE: CPT

## 2019-08-22 PROCEDURE — 84443 ASSAY THYROID STIM HORMONE: CPT

## 2019-08-22 PROCEDURE — 82550 ASSAY OF CK (CPK): CPT

## 2019-08-22 PROCEDURE — 85025 COMPLETE CBC W/AUTO DIFF WBC: CPT

## 2019-08-22 PROCEDURE — 80053 COMPREHEN METABOLIC PANEL: CPT

## 2019-08-23 ENCOUNTER — CLINICAL ABSTRACT (OUTPATIENT)
Dept: CARDIOLOGY | Age: 84
End: 2019-08-23

## 2019-08-27 ENCOUNTER — HOSPITAL ENCOUNTER (OUTPATIENT)
Age: 84
Discharge: HOME | End: 2019-08-27
Attending: FAMILY MEDICINE
Payer: MEDICARE

## 2019-08-27 VITALS
BODY MASS INDEX: 26.07 KG/M2 | HEIGHT: 68 IN | TEMPERATURE: 98 F | HEART RATE: 57 BPM | OXYGEN SATURATION: 98 % | SYSTOLIC BLOOD PRESSURE: 132 MMHG | RESPIRATION RATE: 17 BRPM | DIASTOLIC BLOOD PRESSURE: 80 MMHG | WEIGHT: 172 LBS

## 2019-08-27 DIAGNOSIS — M53.3 SACROILIAC PAIN: Primary | ICD-10-CM

## 2019-08-27 PROCEDURE — 99214 OFFICE O/P EST MOD 30 MIN: CPT

## 2019-08-27 PROCEDURE — 99213 OFFICE O/P EST LOW 20 MIN: CPT

## 2019-08-27 RX ORDER — METHYLPREDNISOLONE 4 MG/1
TABLET ORAL
Qty: 1 PACKAGE | Refills: 0 | Status: SHIPPED | OUTPATIENT
Start: 2019-08-27 | End: 2019-09-03 | Stop reason: ALTCHOICE

## 2019-08-27 RX ORDER — COVID-19 ANTIGEN TEST
220 KIT MISCELLANEOUS AS NEEDED
COMMUNITY
End: 2021-07-30 | Stop reason: ALTCHOICE

## 2019-08-27 NOTE — ED PROVIDER NOTES
Patient Seen in: [address]    History   Patient presents with:  Back Pain (musculoskeletal)    Stated Complaint: lower left side pain    HPI    Patient is here with left low back pain.   Patient feels pain is at the SI j Yes      Alcohol/week: 49.0 standard drinks      Types: 14 Glasses of wine, 21 Cans of beer, 14 Shots of liquor per week      Comment: several beers per day    Drug use:  No             Review of Systems    Positive for stated complaint: lower left side bill [id number]  [phone]    In 1 week  IF NOT BETER        Medications Prescribed:  Discharge Medication List as of 8/27/2019  1:29 PM    START taking these medications    methylPREDNISolone (MEDROL) 4 MG Oral Tablet Therapy Pack  Dosepack: take as directed, No

## 2019-08-27 NOTE — ED INITIAL ASSESSMENT (HPI)
Patient is here with left lower back pain that started a couple of weeks ago. He states it mainly hurts when he lays down. He states he had the same pain a few years back.

## 2019-08-28 ENCOUNTER — TELEPHONE (OUTPATIENT)
Dept: CARDIOLOGY | Age: 84
End: 2019-08-28

## 2019-08-28 ENCOUNTER — TELEPHONE (OUTPATIENT)
Dept: INTERNAL MEDICINE CLINIC | Facility: CLINIC | Age: 84
End: 2019-08-28

## 2019-08-28 NOTE — TELEPHONE ENCOUNTER
Mateusz Kapadia from Dr. Anglin Self office is calling to report hemoglobin drop per blood work and is requesting a call back.

## 2019-08-28 NOTE — TELEPHONE ENCOUNTER
Tamiko Allen office in regards to message below,  spoke with Jose Digna.     Reports lab results with a drop in Hemoglobin 11.2   Last year ( 6/26/2018) Hemoglobin  was 13.3    [place] patient denies taking Motrin, Aleve, no blood in urine  or stool     Pito Garcia wanting Dr. Renee Hodge to be aware of the lab value changes

## 2019-09-03 ENCOUNTER — LAB ENCOUNTER (OUTPATIENT)
Dept: LAB | Age: 84
End: 2019-09-03
Attending: INTERNAL MEDICINE
Payer: MEDICARE

## 2019-09-03 ENCOUNTER — OFFICE VISIT (OUTPATIENT)
Dept: INTERNAL MEDICINE CLINIC | Facility: CLINIC | Age: 84
End: 2019-09-03
Payer: MEDICARE

## 2019-09-03 VITALS
HEART RATE: 48 BPM | BODY MASS INDEX: 26.37 KG/M2 | DIASTOLIC BLOOD PRESSURE: 66 MMHG | RESPIRATION RATE: 16 BRPM | SYSTOLIC BLOOD PRESSURE: 146 MMHG | HEIGHT: 68 IN | WEIGHT: 174 LBS

## 2019-09-03 DIAGNOSIS — C61 PROSTATE CANCER (HCC): ICD-10-CM

## 2019-09-03 DIAGNOSIS — D50.8 OTHER IRON DEFICIENCY ANEMIA: ICD-10-CM

## 2019-09-03 DIAGNOSIS — I10 ESSENTIAL HYPERTENSION WITH GOAL BLOOD PRESSURE LESS THAN 140/90: Primary | ICD-10-CM

## 2019-09-03 DIAGNOSIS — E78.2 MIXED HYPERLIPIDEMIA: ICD-10-CM

## 2019-09-03 LAB
BASOPHILS # BLD AUTO: 0.02 X10(3) UL (ref 0–0.2)
BASOPHILS NFR BLD AUTO: 0.5 %
DEPRECATED HBV CORE AB SER IA-ACNC: 63.7 NG/ML (ref 30–530)
DEPRECATED RDW RBC AUTO: 50.2 FL (ref 35.1–46.3)
EOSINOPHIL # BLD AUTO: 0.38 X10(3) UL (ref 0–0.7)
EOSINOPHIL NFR BLD AUTO: 9.1 %
ERYTHROCYTE [DISTWIDTH] IN BLOOD BY AUTOMATED COUNT: 13.7 % (ref 11–15)
FOLATE SERPL-MCNC: 8.4 NG/ML (ref 8.7–?)
HCT VFR BLD AUTO: 38.2 % (ref 39–53)
HGB BLD-MCNC: 12.5 G/DL (ref 13–17.5)
IMM GRANULOCYTES # BLD AUTO: 0.02 X10(3) UL (ref 0–1)
IMM GRANULOCYTES NFR BLD: 0.5 %
LYMPHOCYTES # BLD AUTO: 0.37 X10(3) UL (ref 1–4)
LYMPHOCYTES NFR BLD AUTO: 8.9 %
MCH RBC QN AUTO: 32.6 PG (ref 26–34)
MCHC RBC AUTO-ENTMCNC: 32.7 G/DL (ref 31–37)
MCV RBC AUTO: 99.7 FL (ref 80–100)
MONOCYTES # BLD AUTO: 0.45 X10(3) UL (ref 0.1–1)
MONOCYTES NFR BLD AUTO: 10.8 %
NEUTROPHILS # BLD AUTO: 2.94 X10 (3) UL (ref 1.5–7.7)
NEUTROPHILS # BLD AUTO: 2.94 X10(3) UL (ref 1.5–7.7)
NEUTROPHILS NFR BLD AUTO: 70.2 %
PLATELET # BLD AUTO: 182 10(3)UL (ref 150–450)
RBC # BLD AUTO: 3.83 X10(6)UL (ref 3.8–5.8)
VIT B12 SERPL-MCNC: >2000 PG/ML (ref 193–986)
WBC # BLD AUTO: 4.2 X10(3) UL (ref 4–11)

## 2019-09-03 PROCEDURE — 36415 COLL VENOUS BLD VENIPUNCTURE: CPT

## 2019-09-03 PROCEDURE — 82728 ASSAY OF FERRITIN: CPT

## 2019-09-03 PROCEDURE — 82746 ASSAY OF FOLIC ACID SERUM: CPT

## 2019-09-03 PROCEDURE — G0463 HOSPITAL OUTPT CLINIC VISIT: HCPCS | Performed by: INTERNAL MEDICINE

## 2019-09-03 PROCEDURE — 99214 OFFICE O/P EST MOD 30 MIN: CPT | Performed by: INTERNAL MEDICINE

## 2019-09-03 PROCEDURE — 82607 VITAMIN B-12: CPT

## 2019-09-03 PROCEDURE — 85025 COMPLETE CBC W/AUTO DIFF WBC: CPT

## 2019-09-03 NOTE — PROGRESS NOTES
Magi Saez is a 82year old male. HPI:   1. Essential hypertension with goal blood pressure less than 140/90    Patient presents for recheck of his hypertension.  Pt has been taking medications as instructed, no medication side effects, home BP macho CHOLESTEROL (P) (mg/dL)   Date Value   08/11/2016 74   05/20/2015 71 (H)   05/22/2014 75 (H)     LDL Cholesterol (mg/dL)   Date Value   08/22/2019 63   06/26/2018 82   06/22/2017 80   08/11/2016 84   05/20/2015 54   05/22/2014 82     AST (U/L)   Date Value TUNNEL RELEASE Right 1/5/2016    Performed by Scarlett Martins MD at [facility] Left 12/23/2015    Performed by Scarlett Martins MD at . [address]    inguinal hernia   • OTHER Right     Carpal Tunnel Yoder control weight and lead to better blood pressure control. 2. Mixed Hyperlipidemia    Patient instructed to take cholesterol lowering medications as prescribed and to continue to follow a low fat, low cholesterol diet as discussed.  Try to exercise at girma

## 2019-09-19 ENCOUNTER — TELEPHONE (OUTPATIENT)
Dept: INTERNAL MEDICINE CLINIC | Facility: CLINIC | Age: 84
End: 2019-09-19

## 2019-09-19 NOTE — TELEPHONE ENCOUNTER
Per pt he went to see DERM as advised and he has 2 melanomas. Also, he states he was advised to see oncologist at [facility], [place] but is wondering if  can refer him to one in [place] or [address] [place] [address].

## 2019-09-19 NOTE — TELEPHONE ENCOUNTER
I need to see the patient in the office and review the path all a G of the skin lesions before recommending another oncologist

## 2019-09-20 NOTE — TELEPHONE ENCOUNTER
Spoke with patient ,advised Dr Rachel Olmstead's note and verbalized understanding. OV made on 9/25/19.      Note      I need to see the patient in the office and review the path all a G of the skin lesions before recommending another oncologist

## 2019-09-25 ENCOUNTER — OFFICE VISIT (OUTPATIENT)
Dept: INTERNAL MEDICINE CLINIC | Facility: CLINIC | Age: 84
End: 2019-09-25
Payer: MEDICARE

## 2019-09-25 VITALS
RESPIRATION RATE: 16 BRPM | HEIGHT: 68 IN | WEIGHT: 174 LBS | HEART RATE: 56 BPM | DIASTOLIC BLOOD PRESSURE: 80 MMHG | SYSTOLIC BLOOD PRESSURE: 151 MMHG | BODY MASS INDEX: 26.37 KG/M2

## 2019-09-25 DIAGNOSIS — E78.2 MIXED HYPERLIPIDEMIA: ICD-10-CM

## 2019-09-25 DIAGNOSIS — C61 PROSTATE CANCER (HCC): ICD-10-CM

## 2019-09-25 DIAGNOSIS — C43.59 MALIGNANT MELANOMA OF TORSO EXCLUDING BREAST (HCC): Primary | ICD-10-CM

## 2019-09-25 DIAGNOSIS — I10 ESSENTIAL HYPERTENSION WITH GOAL BLOOD PRESSURE LESS THAN 140/90: ICD-10-CM

## 2019-09-25 PROCEDURE — 99214 OFFICE O/P EST MOD 30 MIN: CPT | Performed by: INTERNAL MEDICINE

## 2019-09-25 PROCEDURE — G0463 HOSPITAL OUTPT CLINIC VISIT: HCPCS | Performed by: INTERNAL MEDICINE

## 2019-09-25 RX ORDER — FOLIC ACID 1 MG/1
1 TABLET ORAL DAILY
Qty: 90 TABLET | Refills: 3 | Status: SHIPPED | OUTPATIENT
Start: 2019-09-25 | End: 2020-12-15

## 2019-09-26 NOTE — PROGRESS NOTES
Mario Giles is a 82year old male. HPI:   1. Melanoma    Has 2 sores on the back that have been biopsied and are now found to be melanoma. Need to refer for surgical excision.      2. Essential hypertension with goal blood pressure less than 140/90 Oral Cap Take 220 mg by mouth as needed. Disp:  Rfl:    GABAPENTIN 600 MG Oral Tab TAKE ONE TABLET BY MOUTH ONE TIME DAILY  Disp: 90 tablet Rfl: 1   Goserelin Acetate (ZOLADEX SC) Inject into the skin.  Disp:  Rfl:    atorvastatin 40 MG Oral Tab atorvastati standard drinks      Types: 14 Glasses of wine, 21 Cans of beer, 14 Shots of liquor per week      Comment: several beers per day    Drug use: No        REVIEW OF SYSTEMS:   GENERAL HEALTH: feels well otherwise  SKIN: denies any unusual skin lesions or rash prostate cancer Saw MDS. in [place] and also Dr Helga Crowder at [facility] and received radioactive seeds in prostate. Again discussed getting 2nd shingrix vaccine with patient. Had first in December 2019.     The patient indicates understanding of these

## 2019-10-03 ENCOUNTER — TELEPHONE (OUTPATIENT)
Dept: INTERNAL MEDICINE CLINIC | Facility: CLINIC | Age: 84
End: 2019-10-03

## 2019-10-03 DIAGNOSIS — I10 ESSENTIAL HYPERTENSION WITH GOAL BLOOD PRESSURE LESS THAN 140/90: ICD-10-CM

## 2019-10-03 DIAGNOSIS — Z01.818 PRE-OP EXAMINATION: Primary | ICD-10-CM

## 2019-10-04 RX ORDER — GABAPENTIN 600 MG/1
TABLET ORAL
Qty: 90 TABLET | Refills: 1 | Status: SHIPPED | OUTPATIENT
Start: 2019-10-04 | End: 2020-02-28

## 2019-10-04 NOTE — TELEPHONE ENCOUNTER
Find out from the surgeons office what labs are necessary for clearance in the surgery. Have Catarino Mcintosh get an EKG at [facility] that can be reviewed on Monday and if labs are ordered properly and EKG is unremarkable he will be cleared for the surgery.  If any abn

## 2019-10-04 NOTE — TELEPHONE ENCOUNTER
Patient is calling for status of his message. Per patient he is having surgery on Tuesday or Wednesday of next week. Patient can be reached at [phone]. Patient, would like a call back today.

## 2019-10-04 NOTE — TELEPHONE ENCOUNTER
Refill passed per [facility], [facility] protocol.     Refill Protocol Appointment Criteria  · Appointment scheduled in the past 6 months or in the next 3 months  Recent Outpatient Visits            1 week ago Malignant melanoma of torso excluding breast ([facility] [address]

## 2019-10-04 NOTE — TELEPHONE ENCOUNTER
I called Dr. Carolyn Chambers [id number]([phone]) and they said theyd call back, no call yet can we follow up on this. I have ordered a few test if they do not need more than CBC, CMP and EKG then please call patient and let him know to have these test done.  If they

## 2019-10-04 NOTE — TELEPHONE ENCOUNTER
Lynda Gray with Dr Fareed Rod office returning call, confirmed only test needed are CBC, CMP and EKG. Requesting results be faxed once received fax [phone]. Patient has been advised of orders in system, confirmed will complete on Tue morning.  Also conf

## 2019-10-07 ENCOUNTER — LAB ENCOUNTER (OUTPATIENT)
Dept: LAB | Facility: HOSPITAL | Age: 84
End: 2019-10-07
Attending: PHYSICIAN ASSISTANT
Payer: MEDICARE

## 2019-10-07 ENCOUNTER — HOSPITAL (OUTPATIENT)
Dept: OTHER | Age: 84
End: 2019-10-07
Attending: SPECIALIST

## 2019-10-07 DIAGNOSIS — I10 ESSENTIAL HYPERTENSION WITH GOAL BLOOD PRESSURE LESS THAN 140/90: ICD-10-CM

## 2019-10-07 DIAGNOSIS — Z01.818 PRE-OP EXAMINATION: ICD-10-CM

## 2019-10-07 LAB
ABSOLUTE IMMATURE GRANULOCYTES (OFFPRE24): NORMAL
ALBUMIN SERPL-MCNC: 3.8 G/DL
ALBUMIN/GLOB SERPL: NORMAL {RATIO}
ALP SERPL-CCNC: 69 U/L
ALT SERPL-CCNC: 28 U/L
ANION GAP SERPL CALC-SCNC: NORMAL MMOL/L
AST SERPL-CCNC: 19 U/L
BASO+EOS+MONOS # BLD: NORMAL 10*3/UL
BASO+EOS+MONOS NFR BLD: NORMAL %
BASOPHILS # BLD: NORMAL 10*3/UL
BASOPHILS NFR BLD: NORMAL %
BILIRUB SERPL-MCNC: 0.9 MG/DL
BUN SERPL-MCNC: 25 MG/DL
BUN/CREAT SERPL: NORMAL
CALCIUM SERPL-MCNC: 9.6 MG/DL
CHLORIDE SERPL-SCNC: 108 MMOL/L
CO2 SERPL-SCNC: NORMAL MMOL/L
CREAT SERPL-MCNC: 1.19 MG/DL
DIFFERENTIAL METHOD BLD: NORMAL
EOSINOPHIL # BLD: NORMAL 10*3/UL
EOSINOPHIL NFR BLD: NORMAL %
ERYTHROCYTE [DISTWIDTH] IN BLOOD: NORMAL %
GLOBULIN SER-MCNC: 2.7 G/DL
GLUCOSE SERPL-MCNC: 92 MG/DL
HCT VFR BLD CALC: 38.6 %
HGB BLD-MCNC: 12.8 G/DL
IMMATURE GRANULOCYTES (OFFPRE25): NORMAL
LENGTH OF FAST TIME PATIENT: NORMAL H
LYMPHOCYTES # BLD: NORMAL 10*3/UL
LYMPHOCYTES NFR BLD: NORMAL %
MCH RBC QN AUTO: NORMAL PG
MCHC RBC AUTO-ENTMCNC: NORMAL G/DL
MCV RBC AUTO: NORMAL FL
MONOCYTES # BLD: NORMAL 10*3/UL
MONOCYTES NFR BLD: NORMAL %
MPV (OFFPRE2): NORMAL
NEUTROPHILS # BLD: NORMAL 10*3/UL
NEUTROPHILS NFR BLD: NORMAL %
NRBC BLD MANUAL-RTO: NORMAL %
PLAT MORPH BLD: NORMAL
PLATELET # BLD: 144 10*3/UL
POTASSIUM SERPL-SCNC: 4.4 MMOL/L
PROT SERPL-MCNC: 6.5 G/DL
RBC # BLD: 3.86 10*6/UL
RBC MORPH BLD: NORMAL
SODIUM SERPL-SCNC: 142 MMOL/L
WBC # BLD: 5 10*3/UL
WBC MORPH BLD: NORMAL

## 2019-10-07 PROCEDURE — 93010 ELECTROCARDIOGRAM REPORT: CPT | Performed by: PHYSICIAN ASSISTANT

## 2019-10-07 PROCEDURE — 80053 COMPREHEN METABOLIC PANEL: CPT

## 2019-10-07 PROCEDURE — 85025 COMPLETE CBC W/AUTO DIFF WBC: CPT

## 2019-10-07 PROCEDURE — 36415 COLL VENOUS BLD VENIPUNCTURE: CPT

## 2019-10-07 PROCEDURE — 93005 ELECTROCARDIOGRAM TRACING: CPT

## 2019-10-08 ENCOUNTER — CLINICAL ABSTRACT (OUTPATIENT)
Dept: CARDIOLOGY | Age: 84
End: 2019-10-08

## 2019-10-08 NOTE — TELEPHONE ENCOUNTER
Patients labs and EKG look good and were reviewed by PVR, please send these over to Dr. Linnea Cho office. Pt was not seen in the office so not sure if they just need labs sent over or letter as well.  He is ok to proceed with surgery

## 2019-10-17 ENCOUNTER — HOSPITAL (OUTPATIENT)
Dept: OTHER | Age: 84
End: 2019-10-17

## 2019-10-18 ENCOUNTER — TELEPHONE (OUTPATIENT)
Dept: INTERNAL MEDICINE CLINIC | Facility: CLINIC | Age: 84
End: 2019-10-18

## 2019-10-18 NOTE — TELEPHONE ENCOUNTER
Per patient he would like to confirm if he should be seeing a oncologist? Per patient Dr. Jazzy Freire asked patient if he had one and patient wasn't sure.

## 2019-10-18 NOTE — TELEPHONE ENCOUNTER
Have the patience see me one week or so from now when I return and I will refer him to the appropriate specialist at that time. You can add him to my schedule as an add-on that week.

## 2019-10-18 NOTE — TELEPHONE ENCOUNTER
Patient states he saw Dermatologist Dr. Thania Handy for biopies of lesions on back. Dr. Thania Handy recommends patient see Oncology due to positive biopsy for Melanoma. Please advise on referral.     Ethan Sharma 64. [facility].  [address] and taken down as a large circular defect down onto the back where the fat and skin were taken all the way down to the fascia of the underlying musculature, which was removed intact in entirety.  After this was done, a very large V-Y advancement flap was el

## 2019-10-21 LAB — PATHOLOGIST NAME: NORMAL

## 2019-10-25 ENCOUNTER — TELEPHONE (OUTPATIENT)
Dept: INTERNAL MEDICINE CLINIC | Facility: CLINIC | Age: 84
End: 2019-10-25

## 2019-10-25 NOTE — TELEPHONE ENCOUNTER
Patient dropped off Surgical pathology report.  He states Dr Nahum Amaro advised him to find an oncologist and Dr. Nahum Amaro provided the patient with a couple names but the patient forgot and lost the paper with the oncologist names and patient is hoping PVR will re

## 2019-10-30 ENCOUNTER — OFFICE VISIT (OUTPATIENT)
Dept: INTERNAL MEDICINE CLINIC | Facility: CLINIC | Age: 84
End: 2019-10-30
Payer: MEDICARE

## 2019-10-30 VITALS
HEIGHT: 68 IN | HEART RATE: 60 BPM | WEIGHT: 179 LBS | BODY MASS INDEX: 27.13 KG/M2 | SYSTOLIC BLOOD PRESSURE: 125 MMHG | DIASTOLIC BLOOD PRESSURE: 69 MMHG | RESPIRATION RATE: 16 BRPM

## 2019-10-30 DIAGNOSIS — C43.59 MALIGNANT MELANOMA OF TORSO EXCLUDING BREAST (HCC): Primary | ICD-10-CM

## 2019-10-30 PROCEDURE — 99214 OFFICE O/P EST MOD 30 MIN: CPT | Performed by: INTERNAL MEDICINE

## 2019-10-30 PROCEDURE — G0463 HOSPITAL OUTPT CLINIC VISIT: HCPCS | Performed by: INTERNAL MEDICINE

## 2019-10-30 NOTE — PROGRESS NOTES
Jeimy Chaney is a 82year old male. HPI:   1. Melanoma    Has 2 sores on the back and one off the anterior chest that have been removed  and were found to be melanomas. Now needs a referral to oncology for follow up     2.  Essential hypertension with TABLET BY MOUTH ONE TIME DAILY  Disp: 90 tablet Rfl: 1   Goserelin Acetate (ZOLADEX SC) Inject into the skin. Disp:  Rfl:    atorvastatin 40 MG Oral Tab atorvastatin 40 mg tablet Take 1 tablet every day by oral route.  Disp:  Rfl:    DiphenhydrAMINE HCl (BE Comment: several beers per day    Drug use: No        REVIEW OF SYSTEMS:     GENERAL HEALTH: feels well otherwise  SKIN: denies any unusual skin lesions or rashes  RESPIRATORY: denies shortness of breath with exertion  CARDIOVASCULAR: denies chest pain on Again discussed getting 2nd shingrix vaccine with patient. Had first in December 2019. The patient indicates understanding of these issues and agrees to the plan.     The patient is asked to return in 3 months

## 2019-10-31 ENCOUNTER — HOSPITAL ENCOUNTER (OUTPATIENT)
Dept: ULTRASOUND IMAGING | Facility: HOSPITAL | Age: 84
Discharge: HOME | End: 2019-10-31
Attending: ORTHOPAEDIC SURGERY
Payer: MEDICARE

## 2019-10-31 DIAGNOSIS — M79.662 PAIN OF LEFT CALF: ICD-10-CM

## 2019-10-31 PROCEDURE — 93971 EXTREMITY STUDY: CPT | Performed by: ORTHOPAEDIC SURGERY

## 2019-11-04 ENCOUNTER — OFFICE VISIT (OUTPATIENT)
Dept: HEMATOLOGY/ONCOLOGY | Facility: HOSPITAL | Age: 84
End: 2019-11-04
Attending: INTERNAL MEDICINE
Payer: MEDICARE

## 2019-11-04 VITALS
BODY MASS INDEX: 26.98 KG/M2 | TEMPERATURE: 98 F | RESPIRATION RATE: 18 BRPM | OXYGEN SATURATION: 96 % | DIASTOLIC BLOOD PRESSURE: 56 MMHG | HEART RATE: 53 BPM | HEIGHT: 68 IN | SYSTOLIC BLOOD PRESSURE: 126 MMHG | WEIGHT: 178 LBS

## 2019-11-04 DIAGNOSIS — C61 PROSTATE CANCER (HCC): Primary | ICD-10-CM

## 2019-11-04 DIAGNOSIS — C43.9 MALIGNANT MELANOMA, UNSPECIFIED SITE (HCC): ICD-10-CM

## 2019-11-04 PROCEDURE — 99205 OFFICE O/P NEW HI 60 MIN: CPT | Performed by: INTERNAL MEDICINE

## 2019-11-05 ENCOUNTER — MED REC SCAN ONLY (OUTPATIENT)
Dept: INTERNAL MEDICINE CLINIC | Facility: CLINIC | Age: 84
End: 2019-11-05

## 2019-11-05 ENCOUNTER — OFFICE VISIT (OUTPATIENT)
Dept: INTERNAL MEDICINE CLINIC | Facility: CLINIC | Age: 84
End: 2019-11-05
Payer: MEDICARE

## 2019-11-05 VITALS
RESPIRATION RATE: 16 BRPM | BODY MASS INDEX: 26.98 KG/M2 | HEART RATE: 55 BPM | WEIGHT: 178 LBS | SYSTOLIC BLOOD PRESSURE: 155 MMHG | HEIGHT: 68 IN | DIASTOLIC BLOOD PRESSURE: 77 MMHG

## 2019-11-05 DIAGNOSIS — C43.59 MALIGNANT MELANOMA OF TORSO EXCLUDING BREAST (HCC): ICD-10-CM

## 2019-11-05 DIAGNOSIS — I10 ESSENTIAL HYPERTENSION WITH GOAL BLOOD PRESSURE LESS THAN 140/90: ICD-10-CM

## 2019-11-05 DIAGNOSIS — E78.2 MIXED HYPERLIPIDEMIA: Primary | ICD-10-CM

## 2019-11-05 PROCEDURE — G0463 HOSPITAL OUTPT CLINIC VISIT: HCPCS | Performed by: INTERNAL MEDICINE

## 2019-11-05 PROCEDURE — 99214 OFFICE O/P EST MOD 30 MIN: CPT | Performed by: INTERNAL MEDICINE

## 2019-11-05 NOTE — CONSULTS
[facility]    PATIENT'S NAME: Angeles Cesar   CONSULTING PHYSICIAN: Vivi Riojas.  Kristine Chappell MD   PATIENT ACCOUNT #: [de-identified] LOCATION: [address] [place] [address] RECORD #: [id number] YOB: 1935   CONSULTATION DATE: 11/04/2019       CANCER CENT weight loss. He denies any new skin lesions. His wide excision lesions have healed well. His energy level is good. He is active, independent in his activities of daily living. He is otherwise without complaints.   All systems reviewed and are negative has recurrent disease. Thank you very much for the consultation request.  We will see this patient back in our clinic in 6 months. Dictated By Chandni Amaya MD  d: 11/04/2019 15:46:22  t: 11/05/2019 05:57:00  Job [id number]  [facility]/    cc: P

## 2019-11-05 NOTE — PROGRESS NOTES
Estefania Stanton is a 82year old male. HPI:   1. Mixed hyperlipidemia    Patient has been following low cholesterol diet and has been taking and tolerating Cholesterol medicine as prescribed.  No serious side effects such as rash, muscle tenderness or weakn • CAD (coronary artery disease) 2013    medication   • Cancer [facility])    • Hypercholesterolemia    • Hyperlipidemia    • Hypertension    • Inguinal hernia 1990   • Leukopenia    • Lumbar disc disease 2014    epidural back injection   • Prostate cancer ([facility] [address] doppler to be done at [place] on Friday. 3. Essential hypertension with goal blood pressure less than 140/90    Patient instructed to take anti-hypertensive medicines exactly as prescribed and to follow a low salt diet as discussed.  Regular exercise a

## 2019-11-08 ENCOUNTER — HOSPITAL ENCOUNTER (OUTPATIENT)
Dept: ULTRASOUND IMAGING | Facility: HOSPITAL | Age: 84
Discharge: HOME | End: 2019-11-08
Attending: ORTHOPAEDIC SURGERY
Payer: MEDICARE

## 2019-11-08 DIAGNOSIS — R29.898 LEFT LEG WEAKNESS: ICD-10-CM

## 2019-11-08 DIAGNOSIS — I73.9 PVD (PERIPHERAL VASCULAR DISEASE) (HCC): ICD-10-CM

## 2019-11-08 DIAGNOSIS — M79.662 PAIN OF LEFT CALF: ICD-10-CM

## 2019-11-08 PROCEDURE — 93923 UPR/LXTR ART STDY 3+ LVLS: CPT | Performed by: ORTHOPAEDIC SURGERY

## 2019-11-25 ENCOUNTER — OFFICE VISIT (OUTPATIENT)
Dept: INTERNAL MEDICINE CLINIC | Facility: CLINIC | Age: 84
End: 2019-11-25
Payer: MEDICARE

## 2019-11-25 VITALS
DIASTOLIC BLOOD PRESSURE: 68 MMHG | HEIGHT: 68 IN | WEIGHT: 176 LBS | HEART RATE: 68 BPM | SYSTOLIC BLOOD PRESSURE: 110 MMHG | BODY MASS INDEX: 26.67 KG/M2 | RESPIRATION RATE: 16 BRPM

## 2019-11-25 DIAGNOSIS — M21.372 LEFT FOOT DROP: ICD-10-CM

## 2019-11-25 DIAGNOSIS — C43.59 MALIGNANT MELANOMA OF TORSO EXCLUDING BREAST (HCC): ICD-10-CM

## 2019-11-25 DIAGNOSIS — I10 ESSENTIAL HYPERTENSION WITH GOAL BLOOD PRESSURE LESS THAN 140/90: ICD-10-CM

## 2019-11-25 DIAGNOSIS — E78.2 MIXED HYPERLIPIDEMIA: Primary | ICD-10-CM

## 2019-11-25 PROCEDURE — 99214 OFFICE O/P EST MOD 30 MIN: CPT | Performed by: INTERNAL MEDICINE

## 2019-11-25 PROCEDURE — G0463 HOSPITAL OUTPT CLINIC VISIT: HCPCS | Performed by: INTERNAL MEDICINE

## 2019-11-25 RX ORDER — TRAMADOL HYDROCHLORIDE 50 MG/1
50 TABLET ORAL DAILY
COMMUNITY
End: 2020-02-15 | Stop reason: ALTCHOICE

## 2019-11-25 NOTE — PROGRESS NOTES
Suze Courtney is a 82year old male. HPI:   1. Mixed hyperlipidemia    Patient has been following low cholesterol diet and has been taking and tolerating Cholesterol medicine as prescribed.  No serious side effects such as rash, muscle tenderness or weakn Tab Take 81 mg by mouth daily.           Past Medical History:   Diagnosis Date   • Back disorder 2001    per NG: \"lower disc disease\"   • BPH (benign prostatic hyperplasia)     has been on doxaosin   • CAD (coronary artery disease) 2013    medication   • excluding breast ([facility] [address]    Had surgery done on back and had multiple skin lesions removed by Dr Sebastien De León. Saw Dr Ryanne Shaikh and was told he does not need any chemotherapy with the cancers that he had removed. Has a doppler to be done at [facility] on Friday.      3. Es

## 2020-01-06 ENCOUNTER — MED REC SCAN ONLY (OUTPATIENT)
Dept: INTERNAL MEDICINE CLINIC | Facility: CLINIC | Age: 85
End: 2020-01-06

## 2020-01-31 ENCOUNTER — TELEPHONE (OUTPATIENT)
Dept: INTERNAL MEDICINE CLINIC | Facility: CLINIC | Age: 85
End: 2020-01-31

## 2020-01-31 NOTE — TELEPHONE ENCOUNTER
Patient states he is having a CT done on 02/05. States he was advised to see PCP before. No appointments available until 02/05.  Asking if he can be seen Monday

## 2020-02-03 ENCOUNTER — OFFICE VISIT (OUTPATIENT)
Dept: INTERNAL MEDICINE CLINIC | Facility: CLINIC | Age: 85
End: 2020-02-03
Payer: MEDICARE

## 2020-02-03 VITALS
HEART RATE: 63 BPM | WEIGHT: 177 LBS | BODY MASS INDEX: 27 KG/M2 | SYSTOLIC BLOOD PRESSURE: 146 MMHG | RESPIRATION RATE: 16 BRPM | DIASTOLIC BLOOD PRESSURE: 81 MMHG

## 2020-02-03 VITALS — BODY MASS INDEX: 25.76 KG/M2 | WEIGHT: 170 LBS | HEIGHT: 68 IN

## 2020-02-03 DIAGNOSIS — I10 ESSENTIAL HYPERTENSION WITH GOAL BLOOD PRESSURE LESS THAN 140/90: ICD-10-CM

## 2020-02-03 DIAGNOSIS — E78.2 MIXED HYPERLIPIDEMIA: ICD-10-CM

## 2020-02-03 DIAGNOSIS — C43.59 MALIGNANT MELANOMA OF TORSO EXCLUDING BREAST (HCC): ICD-10-CM

## 2020-02-03 DIAGNOSIS — M21.372 LEFT FOOT DROP: Primary | ICD-10-CM

## 2020-02-03 PROCEDURE — G0463 HOSPITAL OUTPT CLINIC VISIT: HCPCS | Performed by: INTERNAL MEDICINE

## 2020-02-03 PROCEDURE — 99214 OFFICE O/P EST MOD 30 MIN: CPT | Performed by: INTERNAL MEDICINE

## 2020-02-03 NOTE — PROGRESS NOTES
Tam Ocampo is a 82year old male. HPI:   1. Left Foot Drop    Has been wearing an AFO on the left foot that helps elevate the foot and avoid falling.      2. Mixed hyperlipidemia    Patient has been following low cholesterol diet and has been taking an daily.           Past Medical History:   Diagnosis Date   • Back disorder 2001    per NG: \"lower disc disease\"   • BPH (benign prostatic hyperplasia)     has been on doxaosin   • CAD (coronary artery disease) 2013    medication   • Cancer ([facility] [address]    • Expos Wednesday    2. Mixed hyperlipidemia    Patient has been following low cholesterol diet and has been taking and tolerating Cholesterol medicine as prescribed. No serious side effects such as rash, muscle tenderness or weakness have been encountered.     3.

## 2020-02-04 NOTE — IMAGING NOTE
Spoke with patient regarding arrival time, NPO status, , LOS and med review. Denies taking ASA 81mg and naproxen for x5 days.

## 2020-02-05 ENCOUNTER — HOSPITAL ENCOUNTER (OUTPATIENT)
Dept: CT IMAGING | Facility: HOSPITAL | Age: 85
Discharge: HOME | End: 2020-02-05
Attending: ORTHOPAEDIC SURGERY
Payer: MEDICARE

## 2020-02-05 ENCOUNTER — HOSPITAL ENCOUNTER (OUTPATIENT)
Dept: GENERAL RADIOLOGY | Facility: HOSPITAL | Age: 85
Discharge: HOME | End: 2020-02-05
Attending: ORTHOPAEDIC SURGERY
Payer: MEDICARE

## 2020-02-05 VITALS
SYSTOLIC BLOOD PRESSURE: 141 MMHG | OXYGEN SATURATION: 99 % | HEART RATE: 62 BPM | RESPIRATION RATE: 15 BRPM | DIASTOLIC BLOOD PRESSURE: 74 MMHG

## 2020-02-05 DIAGNOSIS — M48.061 SPINAL STENOSIS OF LUMBAR REGION WITHOUT NEUROGENIC CLAUDICATION: ICD-10-CM

## 2020-02-05 PROCEDURE — 62305 MYELOGRAPHY LUMBAR INJECTION: CPT | Performed by: ORTHOPAEDIC SURGERY

## 2020-02-05 PROCEDURE — 72126 CT NECK SPINE W/DYE: CPT | Performed by: ORTHOPAEDIC SURGERY

## 2020-02-05 PROCEDURE — 72132 CT LUMBAR SPINE W/DYE: CPT | Performed by: ORTHOPAEDIC SURGERY

## 2020-02-05 NOTE — IMAGING NOTE
1215  Patient to radiology holding. Monitor applied, VSS. Patient denies pain at this time. Patient supine with head raised 30 degrees. Water and crackers provided. Call light within reach. Patient states wife is at home, will arrive as .  Will contin

## 2020-02-05 NOTE — IMAGING NOTE
1051 Identified patient with full name and . Allergies, NPO status, LOS and  reviewed. Medications reviewed as well. Patient states he has been holding ASA 81 mg for x5 days, but is unsure about Naproxen.  Patient states \"I think I took it

## 2020-02-06 NOTE — ED PROVIDER NOTES
Patient Seen in: [facility] Emergency Department    History   Patient presents with:  Cath Tube Problem (gastrointestinal, urinary, integumentary)    Stated Complaint: catheter issues    HPI    The patient is an 72-year-old male status post prosta José Miguel [place] 29  1/5/2016: PATIENT [place] PREOPERATIVE ORDER FOR IV ANT*      Comment: Procedure: ;  Surgeon: Malik Sargent MD;                 Location: [facility]  12/23/2015: REVISE MEDIAN N/CARPAL TUNNEL SURG Left      Comment: Procedure: CARPAL [facility]: He has a normal mood and affect. Nursing note and vitals reviewed.     Differential diagnosis includes UTI, [place] catheter malposition, hematuria with  retention        ED Course   Labs Reviewed - No data to display    ED Course as of Jun 2 none

## 2020-02-15 ENCOUNTER — HOSPITAL ENCOUNTER (OUTPATIENT)
Age: 85
Discharge: HOME | End: 2020-02-15
Attending: FAMILY MEDICINE
Payer: MEDICARE

## 2020-02-15 VITALS
TEMPERATURE: 98 F | RESPIRATION RATE: 18 BRPM | OXYGEN SATURATION: 98 % | DIASTOLIC BLOOD PRESSURE: 57 MMHG | HEART RATE: 56 BPM | SYSTOLIC BLOOD PRESSURE: 112 MMHG

## 2020-02-15 DIAGNOSIS — H10.9 CONJUNCTIVITIS OF BOTH EYES, UNSPECIFIED CONJUNCTIVITIS TYPE: Primary | ICD-10-CM

## 2020-02-15 PROCEDURE — 99213 OFFICE O/P EST LOW 20 MIN: CPT

## 2020-02-15 PROCEDURE — 99214 OFFICE O/P EST MOD 30 MIN: CPT

## 2020-02-15 RX ORDER — TOBRAMYCIN 3 MG/ML
2 SOLUTION/ DROPS OPHTHALMIC
Qty: 1 BOTTLE | Refills: 0 | Status: SHIPPED | OUTPATIENT
Start: 2020-02-15 | End: 2020-02-20

## 2020-02-15 NOTE — ED PROVIDER NOTES
Patient Seen in: [address]    History   Patient presents with:  Eye Problem    Stated Complaint: eye problem    HPI    Pt complains of bilateral eye redness and d/c  for 4 days . Associated symptoms : Has a cold.  No fev needed. atorvastatin 40 MG Oral Tab,  atorvastatin 40 mg tablet   Take 1 tablet every day by oral route. DiphenhydrAMINE HCl (BENADRYL) 25 MG Oral Cap,  Take 25 mg by mouth every 6 (six) hours as needed for Itching.    Metoprolol Tartrate (LOPRESSOR) 50 Tobrex was prescribed. Use warm compresses. F/u with PCP as needed.       Disposition and Plan     Clinical Impression:  Conjunctivitis of both eyes, unspecified conjunctivitis type  (primary encounter diagnosis)    Disposition:  Discharge    Follow-up:  [place]

## 2020-02-26 ENCOUNTER — MED REC SCAN ONLY (OUTPATIENT)
Dept: INTERNAL MEDICINE CLINIC | Facility: CLINIC | Age: 85
End: 2020-02-26

## 2020-02-28 RX ORDER — GABAPENTIN 600 MG/1
TABLET ORAL
Qty: 90 TABLET | Refills: 0 | Status: SHIPPED | OUTPATIENT
Start: 2020-02-28

## 2020-02-28 NOTE — TELEPHONE ENCOUNTER
Refill passed per [facility] protocol.   Refill Protocol Appointment Criteria  · Appointment scheduled in the past 6 months or in the next 3 months  Recent Outpatient Visits            3 weeks ago Left foot drop    [facility], [address], [employer]

## 2020-05-06 ENCOUNTER — TELEPHONE (OUTPATIENT)
Dept: INTERNAL MEDICINE CLINIC | Facility: CLINIC | Age: 85
End: 2020-05-06

## 2020-05-06 NOTE — TELEPHONE ENCOUNTER
Patient is requesting to see Dr. Chelly Og in the office for a follow up from last office visit in May at the [facility] office . Patient declined video/ telephone visit. Please advise if patient can come in for an appointment.

## 2020-05-12 ENCOUNTER — APPOINTMENT (OUTPATIENT)
Dept: HEMATOLOGY/ONCOLOGY | Facility: HOSPITAL | Age: 85
End: 2020-05-12
Attending: INTERNAL MEDICINE
Payer: MEDICARE

## 2020-06-02 ENCOUNTER — OFFICE VISIT (OUTPATIENT)
Dept: INTERNAL MEDICINE CLINIC | Facility: CLINIC | Age: 85
End: 2020-06-02
Payer: MEDICARE

## 2020-06-02 VITALS
HEIGHT: 68 IN | BODY MASS INDEX: 27.13 KG/M2 | WEIGHT: 179 LBS | DIASTOLIC BLOOD PRESSURE: 68 MMHG | SYSTOLIC BLOOD PRESSURE: 115 MMHG | RESPIRATION RATE: 16 BRPM | HEART RATE: 61 BPM

## 2020-06-02 DIAGNOSIS — I73.9 PVD (PERIPHERAL VASCULAR DISEASE) (HCC): ICD-10-CM

## 2020-06-02 DIAGNOSIS — I10 ESSENTIAL HYPERTENSION WITH GOAL BLOOD PRESSURE LESS THAN 140/90: Primary | ICD-10-CM

## 2020-06-02 DIAGNOSIS — E78.2 MIXED HYPERLIPIDEMIA: ICD-10-CM

## 2020-06-02 DIAGNOSIS — C43.59 MALIGNANT MELANOMA OF TORSO EXCLUDING BREAST (HCC): ICD-10-CM

## 2020-06-02 PROCEDURE — G0463 HOSPITAL OUTPT CLINIC VISIT: HCPCS | Performed by: INTERNAL MEDICINE

## 2020-06-02 PROCEDURE — 99214 OFFICE O/P EST MOD 30 MIN: CPT | Performed by: INTERNAL MEDICINE

## 2020-06-02 NOTE — PROGRESS NOTES
Tarsha Edward is a 82year old male. HPI:     1. Essential hypertension with goal blood pressure less than 140/90    Patient has been following low salt diet and has been taking anti-hypertensive prescriptions as prescribed.  Blood pressure has been check Past Medical History:   Diagnosis Date   • Back disorder 2001    per NG: \"lower disc disease\"   • BPH (benign prostatic hyperplasia)     has been on doxaosin   • CAD (coronary artery disease) 2013    medication   • Cancer ([facility] [address]    • Exposure to med low salt diet as discussed. Regular exercise at least 3 times weekly will help to curb one's appetite, control weight and lead to better blood pressure control. Record blood pressures at home and bring readings to your next office visit to review.     Hannah

## 2020-06-17 ENCOUNTER — MED REC SCAN ONLY (OUTPATIENT)
Dept: INTERNAL MEDICINE CLINIC | Facility: CLINIC | Age: 85
End: 2020-06-17

## 2020-06-22 ENCOUNTER — LAB ENCOUNTER (OUTPATIENT)
Dept: LAB | Facility: HOSPITAL | Age: 85
End: 2020-06-22
Attending: RADIOLOGY
Payer: MEDICARE

## 2020-06-22 DIAGNOSIS — C43.9 MALIGNANT MELANOMA, UNSPECIFIED SITE (HCC): ICD-10-CM

## 2020-06-22 DIAGNOSIS — C61 MALIGNANT NEOPLASM OF PROSTATE (HCC): ICD-10-CM

## 2020-06-22 LAB
ALBUMIN SERPL-MCNC: 3.4 G/DL
ALBUMIN/GLOB SERPL: 1 {RATIO}
ALP SERPL-CCNC: 98 U/L
ALT SERPL-CCNC: 23 UNITS/L
ANION GAP SERPL CALC-SCNC: 4 MMOL/L
AST SERPL-CCNC: 20 UNITS/L
BILIRUB SERPL-MCNC: 0.8 MG/DL
BUN SERPL-MCNC: 21 MG/DL
BUN/CREAT SERPL: 19.4
CALCIUM SERPL-MCNC: 9.1 MG/DL
CHLORIDE SERPL-SCNC: 111 MMOL/L
CO2 SERPL-SCNC: 27 MMOL/L
CREAT SERPL-MCNC: 1.08 MG/DL
GLOBULIN SER-MCNC: 3.3 G/DL
GLUCOSE SERPL-MCNC: 101 MG/DL
HCT VFR BLD CALC: 37.9 %
HGB BLD-MCNC: 12.3 G/DL
LENGTH OF FAST TIME PATIENT: YES H
MCH RBC QN AUTO: 31.6 PG
MCHC RBC AUTO-ENTMCNC: 32.5 G/DL
MCV RBC AUTO: 97.4 FL
PLATELET # BLD: 173 K/MCL
POTASSIUM SERPL-SCNC: 4.5 MMOL/L
PROT SERPL-MCNC: 6.7 G/DL
RBC # BLD: 3.89 10*6/UL
SODIUM SERPL-SCNC: 142 MMOL/L
WBC # BLD: 2.7 K/MCL

## 2020-06-22 PROCEDURE — 83615 LACTATE (LD) (LDH) ENZYME: CPT

## 2020-06-22 PROCEDURE — 84153 ASSAY OF PSA TOTAL: CPT

## 2020-06-22 PROCEDURE — 36415 COLL VENOUS BLD VENIPUNCTURE: CPT

## 2020-06-22 PROCEDURE — 80053 COMPREHEN METABOLIC PANEL: CPT

## 2020-06-22 PROCEDURE — 85025 COMPLETE CBC W/AUTO DIFF WBC: CPT

## 2020-07-13 ENCOUNTER — MED REC SCAN ONLY (OUTPATIENT)
Dept: INTERNAL MEDICINE CLINIC | Facility: CLINIC | Age: 85
End: 2020-07-13

## 2020-07-31 ENCOUNTER — DOCUMENTATION (OUTPATIENT)
Dept: CARDIOLOGY | Age: 85
End: 2020-07-31

## 2020-08-03 ENCOUNTER — ANCILLARY PROCEDURE (OUTPATIENT)
Dept: CARDIOLOGY | Age: 85
End: 2020-08-03
Attending: INTERNAL MEDICINE

## 2020-08-03 VITALS
WEIGHT: 172 LBS | HEIGHT: 68 IN | HEART RATE: 45 BPM | DIASTOLIC BLOOD PRESSURE: 60 MMHG | BODY MASS INDEX: 26.07 KG/M2 | SYSTOLIC BLOOD PRESSURE: 126 MMHG

## 2020-08-03 DIAGNOSIS — I25.10 CORONARY ARTERY DISEASE INVOLVING NATIVE CORONARY ARTERY OF NATIVE HEART WITHOUT ANGINA PECTORIS: ICD-10-CM

## 2020-08-03 LAB
LV EF: 69 %
RESTING HR ACHIEVED: 45 BPM
STRESS BASELINE BP: NORMAL MMHG
STRESS TARGET HR: 137 BPM

## 2020-08-03 PROCEDURE — 78452 HT MUSCLE IMAGE SPECT MULT: CPT | Performed by: INTERNAL MEDICINE

## 2020-08-03 PROCEDURE — 93015 CV STRESS TEST SUPVJ I&R: CPT | Performed by: INTERNAL MEDICINE

## 2020-08-03 PROCEDURE — A9502 TC99M TETROFOSMIN: HCPCS | Performed by: INTERNAL MEDICINE

## 2020-08-03 RX ORDER — REGADENOSON 0.08 MG/ML
0.4 INJECTION, SOLUTION INTRAVENOUS ONCE
Status: COMPLETED | OUTPATIENT
Start: 2020-08-03 | End: 2020-08-03

## 2020-08-03 RX ADMIN — REGADENOSON 0.4 MG: 0.08 INJECTION, SOLUTION INTRAVENOUS at 09:25

## 2020-08-03 ASSESSMENT — EXERCISE STRESS TEST
STRESS_SYMPTOMS: DYSPNEA
PEAK_RPP: 7168
STRESS_SYMPTOMS: DYSPNEA
STOPPAGE_REASON: PROTOCOL COMPLETE
PEAK_BP: 112/54
PEAK_BP: 110/50
PEAK_RPP: 6380
STAGE_CATEGORIES: RECOVERY 0
STOPPAGE_REASON: PROTOCOL COMPLETE
PEAK_HR: 66
PEAK_BP: 126/60
PEAK_HR: 45
PEAK_RPP: 8580
STOPPAGE_REASON: PROTOCOL COMPLETE
STAGE_CATEGORIES: RECOVERY 1
PEAK_HR: 64
PEAK_RPP: 5670
PEAK_HR: 58
PEAK_BP: 130/60

## 2020-08-24 RX ORDER — METOPROLOL TARTRATE 50 MG/1
50 TABLET, FILM COATED ORAL DAILY
COMMUNITY
End: 2020-08-25 | Stop reason: SDUPTHER

## 2020-08-24 RX ORDER — PREDNISONE 10 MG/1
10 TABLET ORAL 2 TIMES DAILY WITH MEALS
COMMUNITY
End: 2020-08-25

## 2020-08-25 ENCOUNTER — OFFICE VISIT (OUTPATIENT)
Dept: CARDIOLOGY | Age: 85
End: 2020-08-25

## 2020-08-25 VITALS
HEART RATE: 106 BPM | SYSTOLIC BLOOD PRESSURE: 124 MMHG | WEIGHT: 181 LBS | DIASTOLIC BLOOD PRESSURE: 62 MMHG | OXYGEN SATURATION: 98 % | RESPIRATION RATE: 18 BRPM | HEIGHT: 68 IN | BODY MASS INDEX: 27.43 KG/M2

## 2020-08-25 DIAGNOSIS — I25.10 CORONARY ARTERY DISEASE INVOLVING NATIVE CORONARY ARTERY OF NATIVE HEART WITHOUT ANGINA PECTORIS: Primary | ICD-10-CM

## 2020-08-25 DIAGNOSIS — I10 ESSENTIAL HYPERTENSION WITH GOAL BLOOD PRESSURE LESS THAN 140/90: ICD-10-CM

## 2020-08-25 DIAGNOSIS — E78.2 HYPERLIPIDEMIA, MIXED: ICD-10-CM

## 2020-08-25 PROCEDURE — 99214 OFFICE O/P EST MOD 30 MIN: CPT | Performed by: INTERNAL MEDICINE

## 2020-08-25 ASSESSMENT — PATIENT HEALTH QUESTIONNAIRE - PHQ9
CLINICAL INTERPRETATION OF PHQ2 SCORE: NO FURTHER SCREENING NEEDED
SUM OF ALL RESPONSES TO PHQ9 QUESTIONS 1 AND 2: 0
2. FEELING DOWN, DEPRESSED OR HOPELESS: NOT AT ALL
CLINICAL INTERPRETATION OF PHQ9 SCORE: NO FURTHER SCREENING NEEDED
1. LITTLE INTEREST OR PLEASURE IN DOING THINGS: NOT AT ALL
SUM OF ALL RESPONSES TO PHQ9 QUESTIONS 1 AND 2: 0

## 2020-08-28 ENCOUNTER — LAB ENCOUNTER (OUTPATIENT)
Dept: LAB | Facility: HOSPITAL | Age: 85
End: 2020-08-28
Attending: INTERNAL MEDICINE
Payer: MEDICARE

## 2020-08-28 DIAGNOSIS — E78.2 MIXED HYPERLIPIDEMIA: Primary | ICD-10-CM

## 2020-08-28 LAB
ALT SERPL-CCNC: 21 U/L (ref 16–61)
ALT SERPL-CCNC: 21 UNITS/L
AST SERPL-CCNC: 21 U/L (ref 15–37)
AST SERPL-CCNC: 21 UNITS/L
CHOLEST SERPL-MCNC: 153 MG/DL
CHOLEST SMN-MCNC: 153 MG/DL (ref ?–200)
HDLC SERPL-MCNC: 80 MG/DL
HDLC SERPL-MCNC: 80 MG/DL (ref 40–59)
LDLC SERPL CALC-MCNC: 56 MG/DL
LDLC SERPL CALC-MCNC: 56 MG/DL (ref ?–100)
NONHDLC SERPL-MCNC: 73 MG/DL (ref ?–130)
PATIENT FASTING Y/N/NP: YES
TRIGL SERPL-MCNC: 84 MG/DL
TRIGL SERPL-MCNC: 84 MG/DL (ref 30–149)
VLDLC SERPL CALC-MCNC: 17 MG/DL (ref 0–30)

## 2020-08-28 PROCEDURE — 80061 LIPID PANEL: CPT

## 2020-08-28 PROCEDURE — 84460 ALANINE AMINO (ALT) (SGPT): CPT

## 2020-08-28 PROCEDURE — 36415 COLL VENOUS BLD VENIPUNCTURE: CPT

## 2020-08-28 PROCEDURE — 84450 TRANSFERASE (AST) (SGOT): CPT

## 2020-08-31 ENCOUNTER — CLINICAL ABSTRACT (OUTPATIENT)
Dept: CARDIOLOGY | Age: 85
End: 2020-08-31

## 2020-09-16 ENCOUNTER — MED REC SCAN ONLY (OUTPATIENT)
Dept: INTERNAL MEDICINE CLINIC | Facility: CLINIC | Age: 85
End: 2020-09-16

## 2020-09-28 RX ORDER — METOPROLOL SUCCINATE 50 MG/1
TABLET, EXTENDED RELEASE ORAL
Qty: 90 TABLET | Refills: 0 | Status: SHIPPED | OUTPATIENT
Start: 2020-09-28 | End: 2020-12-11

## 2020-09-28 RX ORDER — ATORVASTATIN CALCIUM 40 MG/1
TABLET, FILM COATED ORAL
Qty: 90 TABLET | Refills: 0 | Status: SHIPPED | OUTPATIENT
Start: 2020-09-28 | End: 2020-12-10

## 2020-10-20 ENCOUNTER — HOSPITAL ENCOUNTER (OUTPATIENT)
Age: 85
Discharge: HOME | End: 2020-10-20
Payer: MEDICARE

## 2020-10-20 ENCOUNTER — APPOINTMENT (OUTPATIENT)
Dept: GENERAL RADIOLOGY | Age: 85
End: 2020-10-20
Attending: PHYSICIAN ASSISTANT
Payer: MEDICARE

## 2020-10-20 VITALS
BODY MASS INDEX: 26.22 KG/M2 | SYSTOLIC BLOOD PRESSURE: 149 MMHG | RESPIRATION RATE: 16 BRPM | HEART RATE: 53 BPM | DIASTOLIC BLOOD PRESSURE: 72 MMHG | HEIGHT: 68 IN | OXYGEN SATURATION: 100 % | TEMPERATURE: 97 F | WEIGHT: 173 LBS

## 2020-10-20 DIAGNOSIS — M25.422 ELBOW SWELLING, LEFT: Primary | ICD-10-CM

## 2020-10-20 PROCEDURE — 73080 X-RAY EXAM OF ELBOW: CPT | Performed by: PHYSICIAN ASSISTANT

## 2020-10-20 PROCEDURE — 99203 OFFICE O/P NEW LOW 30 MIN: CPT | Performed by: PHYSICIAN ASSISTANT

## 2020-10-20 NOTE — ED PROVIDER NOTES
Patient Seen in: [facility]      History   Patient presents with:  Swelling    Stated Complaint: had a fall    HPI    53-year-old male with past medical history as listed below here for evaluation of left elbow swelling.  Pt had a mechanical Frequency: 4 or more times a week      Drinks per session: 5 or 6      Comment: daily    Drug use: No             Review of Systems    Positive for stated complaint: had a fall  Other systems are as noted in HPI.   Constitutional and vital signs reviewe Encouraged warm compresses, ace compression and follow up with ortho        MDM                    Disposition and Plan     Clinical Impression:  Elbow swelling, left  (primary encounter diagnosis)    Disposition:  Discharge  10/20/2020  2:11 pm    Follow-

## 2020-10-20 NOTE — ED INITIAL ASSESSMENT (HPI)
Pt c/o swelling to L elbow x2.5 weeks after falling on the hardwood floor. Full ROM noted to L elbow. Golf ball size swelling noted to L elbow. Positive bruising. No LOC. No head injury. States initially had to R hip better now.   States able to continu

## 2020-10-26 ENCOUNTER — MED REC SCAN ONLY (OUTPATIENT)
Dept: INTERNAL MEDICINE CLINIC | Facility: CLINIC | Age: 85
End: 2020-10-26

## 2020-11-09 ENCOUNTER — TELEPHONE (OUTPATIENT)
Dept: INTERNAL MEDICINE CLINIC | Facility: CLINIC | Age: 85
End: 2020-11-09

## 2020-11-09 NOTE — TELEPHONE ENCOUNTER
Patient was seen at [facility] in [facility] by neurologist Dr. Lupis Sidhu on 10/12 regarding lack of sensation in legs. This has been an ongoing problem for the patient. Patient had bloodwork done on 10/13 and was told it was imperative that he follows up with us. Patient went out of town to [facility] right after this and was told to call us ASAP when he got back. Patient requested through [address] [place] [address] that they sent our office the results of the blood test, but we have not received them. Can we please reach out to [employer] to obtain these records for the patient as he is having difficulty. Please contact patient when results have been reviewed and inform of his next steps in following up. Patient would like to be called back even if results are considered non urgent, as he was told by them it was very urgent.     Dr. Susana Cadet number: [phone]  Fax: [phone]

## 2020-11-10 NOTE — TELEPHONE ENCOUNTER
Spoke to patient and informed him that Dr Donna Hunter office is closed Will reach out tomorrow morning to get the results faxed so DR Bentley Minicharissa can review them

## 2020-11-25 ENCOUNTER — OFFICE VISIT (OUTPATIENT)
Dept: OTOLARYNGOLOGY | Facility: CLINIC | Age: 85
End: 2020-11-25
Payer: MEDICARE

## 2020-11-25 VITALS
HEART RATE: 49 BPM | WEIGHT: 175 LBS | DIASTOLIC BLOOD PRESSURE: 73 MMHG | SYSTOLIC BLOOD PRESSURE: 130 MMHG | BODY MASS INDEX: 26.52 KG/M2 | HEIGHT: 68 IN

## 2020-11-25 DIAGNOSIS — R26.89 IMBALANCE: ICD-10-CM

## 2020-11-25 DIAGNOSIS — H61.23 BILATERAL IMPACTED CERUMEN: Primary | ICD-10-CM

## 2020-11-25 PROCEDURE — 69210 REMOVE IMPACTED EAR WAX UNI: CPT | Performed by: OTOLARYNGOLOGY

## 2020-11-25 PROCEDURE — G0463 HOSPITAL OUTPT CLINIC VISIT: HCPCS | Performed by: OTOLARYNGOLOGY

## 2020-11-25 PROCEDURE — 99213 OFFICE O/P EST LOW 20 MIN: CPT | Performed by: OTOLARYNGOLOGY

## 2020-11-25 NOTE — PROGRESS NOTES
Ron Crawford is a 82year old male.  Patient presents with:  Hearing Loss: muffled hearing in the right ear,complains of balance issues but states its from his lumbar ,needs ears cleaned today      HISTORY OF PRESENT ILLNESS  12/27/2018  Patient  presents Food insecurity        Worry: Not on file        Inability: Not on file      Transportation needs        Medical: Not on file        Non-medical: Not on file    Tobacco Use      Smoking status: Never Smoker      Smokeless tobacco: Never Used    Substance a Date   • Back disorder 2001    per NG: \"lower disc disease\"   • BPH (benign prostatic hyperplasia)     has been on doxaosin   • CAD (coronary artery disease) 2013    medication   • Cancer [facility])    • Exposure to medical diagnostic radiation    • Hearing i lesions bruises or masses.    Constitutional Normal Overall appearance - Normal.   Head/Face Normal Facial features - Normal. Eyebrows - Normal. Skull - Normal.   Nasopharynx Normal External nose - Normal.  .   Neurological Normal Memory - Normal. Cranial n mouth., Disp: , Rfl:   •  aspirin 81 MG Oral Tab, Take 81 mg by mouth daily. , Disp: , Rfl:     ASSESSMENT AND PLAN  1. Cerumen  Patient noted improved  hearing aid function  to baseline after wax was removed  2.  Imbalance  The pathophysiology of dizzines

## 2020-12-01 ENCOUNTER — OFFICE VISIT (OUTPATIENT)
Dept: INTERNAL MEDICINE CLINIC | Facility: CLINIC | Age: 85
End: 2020-12-01
Payer: MEDICARE

## 2020-12-01 VITALS
HEIGHT: 68 IN | DIASTOLIC BLOOD PRESSURE: 82 MMHG | HEART RATE: 52 BPM | WEIGHT: 183 LBS | RESPIRATION RATE: 16 BRPM | SYSTOLIC BLOOD PRESSURE: 160 MMHG | BODY MASS INDEX: 27.74 KG/M2

## 2020-12-01 DIAGNOSIS — E78.2 MIXED HYPERLIPIDEMIA: ICD-10-CM

## 2020-12-01 DIAGNOSIS — I10 ESSENTIAL HYPERTENSION WITH GOAL BLOOD PRESSURE LESS THAN 140/90: Primary | ICD-10-CM

## 2020-12-01 DIAGNOSIS — C43.59 MALIGNANT MELANOMA OF TORSO EXCLUDING BREAST (HCC): ICD-10-CM

## 2020-12-01 DIAGNOSIS — Z23 NEED FOR VACCINATION: ICD-10-CM

## 2020-12-01 PROCEDURE — G0463 HOSPITAL OUTPT CLINIC VISIT: HCPCS | Performed by: INTERNAL MEDICINE

## 2020-12-01 PROCEDURE — G0009 ADMIN PNEUMOCOCCAL VACCINE: HCPCS | Performed by: INTERNAL MEDICINE

## 2020-12-01 PROCEDURE — 90732 PPSV23 VACC 2 YRS+ SUBQ/IM: CPT | Performed by: INTERNAL MEDICINE

## 2020-12-01 PROCEDURE — 99214 OFFICE O/P EST MOD 30 MIN: CPT | Performed by: INTERNAL MEDICINE

## 2020-12-01 NOTE — PROGRESS NOTES
Ava Lamar is a 82year old male. HPI:     1. Essential hypertension with goal blood pressure less than 140/90    Patient has been following low salt diet and has been taking anti-hypertensive prescriptions as prescribed.  Blood pressure has been check Diagnosis Date   • Back disorder 2001    per NG: \"lower disc disease\"   • BPH (benign prostatic hyperplasia)     has been on doxaosin   • CAD (coronary artery disease) 2013    medication   • Cancer [facility])    • Exposure to medical diagnostic radiation at least 3 times weekly will help to curb one's appetite, control weight and lead to better blood pressure control. Record blood pressures at home and bring readings to your next office visit to review.     Suggest patient wear compression hose to keep the

## 2020-12-10 RX ORDER — ATORVASTATIN CALCIUM 40 MG/1
TABLET, FILM COATED ORAL
Qty: 90 TABLET | Refills: 2 | Status: SHIPPED | OUTPATIENT
Start: 2020-12-10

## 2020-12-11 RX ORDER — METOPROLOL SUCCINATE 50 MG/1
50 TABLET, EXTENDED RELEASE ORAL DAILY
Qty: 90 TABLET | Refills: 1 | Status: SHIPPED | OUTPATIENT
Start: 2020-12-11 | End: 2021-06-02

## 2020-12-15 ENCOUNTER — MED REC SCAN ONLY (OUTPATIENT)
Dept: INTERNAL MEDICINE CLINIC | Facility: CLINIC | Age: 85
End: 2020-12-15

## 2020-12-15 RX ORDER — FOLIC ACID 1 MG/1
1 TABLET ORAL DAILY
Qty: 90 TABLET | Refills: 3 | Status: SHIPPED | OUTPATIENT
Start: 2020-12-15

## 2020-12-15 NOTE — TELEPHONE ENCOUNTER
Pharmacy requesting refill for patient, they sent request and haven't heard back. Pharmacy was advised nothing was received and requesting will be sent now.      folic acid 1 MG Oral Tab 90 tablet 3 9/25/2019    Sig:   Take 1 tablet (1 mg total) by mouth da

## 2020-12-21 ENCOUNTER — LAB ENCOUNTER (OUTPATIENT)
Dept: LAB | Facility: HOSPITAL | Age: 85
End: 2020-12-21
Attending: RADIOLOGY
Payer: MEDICARE

## 2020-12-21 DIAGNOSIS — C61 MALIGNANT NEOPLASM OF PROSTATE (HCC): ICD-10-CM

## 2020-12-21 PROCEDURE — 84153 ASSAY OF PSA TOTAL: CPT

## 2020-12-21 PROCEDURE — 36415 COLL VENOUS BLD VENIPUNCTURE: CPT

## 2021-03-03 DIAGNOSIS — Z23 NEED FOR VACCINATION: ICD-10-CM

## 2021-05-26 ENCOUNTER — APPOINTMENT (OUTPATIENT)
Dept: GENERAL RADIOLOGY | Age: 86
End: 2021-05-26
Attending: NURSE PRACTITIONER
Payer: MEDICARE

## 2021-05-26 ENCOUNTER — HOSPITAL ENCOUNTER (OUTPATIENT)
Age: 86
Discharge: HOME | End: 2021-05-26
Payer: MEDICARE

## 2021-05-26 VITALS
HEART RATE: 42 BPM | SYSTOLIC BLOOD PRESSURE: 145 MMHG | DIASTOLIC BLOOD PRESSURE: 61 MMHG | OXYGEN SATURATION: 97 % | TEMPERATURE: 97 F | RESPIRATION RATE: 16 BRPM

## 2021-05-26 DIAGNOSIS — S66.912A STRAIN OF LEFT WRIST, INITIAL ENCOUNTER: Primary | ICD-10-CM

## 2021-05-26 PROCEDURE — 73110 X-RAY EXAM OF WRIST: CPT | Performed by: NURSE PRACTITIONER

## 2021-05-26 PROCEDURE — 99203 OFFICE O/P NEW LOW 30 MIN: CPT | Performed by: NURSE PRACTITIONER

## 2021-05-26 PROCEDURE — L3908 WHO COCK-UP NONMOLDE PRE OTS: HCPCS | Performed by: NURSE PRACTITIONER

## 2021-05-26 NOTE — ED INITIAL ASSESSMENT (HPI)
Pt states that he has had a nagging pain to his left wrist for 3-4 weeks and then yesterday he made a wrist movement and now has pain in the wrist that kept him awake during the night.

## 2021-05-26 NOTE — ED PROVIDER NOTES
Patient presents with:  Arm or Hand Injury      HPI:     Hermila Commander is a 82year old male who presents today with a chief complaint of pain in the left wrist after he used a bush laura yesterday.   The pain is to the posterior aspect of the left wrist Yes          2-3 cup of coffee daily        Occupational Exposure: Not Asked        Hobby Hazards: Not Asked        Sleep Concern: Not Asked        Stress Concern: Not Asked        Weight Concern: Not Asked        Special Diet: Not Asked        Back Care: (!) 42   Temp 97.3 °F (36.3 °C) (Temporal)   Resp 16   SpO2 97%   GENERAL: well developed, well nourished, well hydrated, no distress  SKIN: good skin turgor, no obvious rashes  HEENT: atraumatic, normocephalic, ears, nose and throat are clear  EYES: scler his orthopedic needs. A copy of the XR was given to the patient. His heart rate is low. He is on Lopressor. He states this is normal for him. I did look back through his chart and he generally is bradycardic.   He denies any dizziness, weakness, chest p

## 2021-06-02 RX ORDER — METOPROLOL SUCCINATE 50 MG/1
TABLET, EXTENDED RELEASE ORAL
Qty: 90 TABLET | Refills: 0 | Status: SHIPPED | OUTPATIENT
Start: 2021-06-02

## 2021-06-10 ENCOUNTER — MED REC SCAN ONLY (OUTPATIENT)
Dept: INTERNAL MEDICINE CLINIC | Facility: CLINIC | Age: 86
End: 2021-06-10

## 2021-06-15 ENCOUNTER — LAB ENCOUNTER (OUTPATIENT)
Dept: LAB | Facility: HOSPITAL | Age: 86
End: 2021-06-15
Attending: RADIOLOGY
Payer: MEDICARE

## 2021-06-15 DIAGNOSIS — C61 MALIGNANT NEOPLASM OF PROSTATE (HCC): ICD-10-CM

## 2021-06-15 PROCEDURE — 36415 COLL VENOUS BLD VENIPUNCTURE: CPT

## 2021-06-15 PROCEDURE — 84153 ASSAY OF PSA TOTAL: CPT

## 2021-07-13 ENCOUNTER — LAB ENCOUNTER (OUTPATIENT)
Dept: LAB | Facility: HOSPITAL | Age: 86
End: 2021-07-13
Attending: INTERNAL MEDICINE
Payer: MEDICARE

## 2021-07-13 DIAGNOSIS — C61 MALIGNANT NEOPLASM OF PROSTATE (HCC): ICD-10-CM

## 2021-07-13 DIAGNOSIS — N39.0 URINARY TRACT INFECTION, SITE NOT SPECIFIED: ICD-10-CM

## 2021-07-13 DIAGNOSIS — E78.00 PURE HYPERCHOLESTEROLEMIA: Primary | ICD-10-CM

## 2021-07-13 LAB
ALBUMIN SERPL-MCNC: 3.7 G/DL (ref 3.4–5)
ALBUMIN/GLOB SERPL: 1.3 {RATIO} (ref 1–2)
ALP LIVER SERPL-CCNC: 76 U/L
ALT SERPL-CCNC: 25 U/L
ANION GAP SERPL CALC-SCNC: 7 MMOL/L (ref 0–18)
AST SERPL-CCNC: 21 U/L (ref 15–37)
BASOPHILS # BLD AUTO: 0.02 X10(3) UL (ref 0–0.2)
BASOPHILS NFR BLD AUTO: 0.6 %
BILIRUB SERPL-MCNC: 1.1 MG/DL (ref 0.1–2)
BILIRUB UR QL: NEGATIVE
BUN BLD-MCNC: 26 MG/DL (ref 7–18)
BUN/CREAT SERPL: 24.5 (ref 10–20)
CALCIUM BLD-MCNC: 9.3 MG/DL (ref 8.5–10.1)
CHLORIDE SERPL-SCNC: 110 MMOL/L (ref 98–112)
CHOLEST SMN-MCNC: 151 MG/DL (ref ?–200)
CLARITY UR: CLEAR
CO2 SERPL-SCNC: 24 MMOL/L (ref 21–32)
COLOR UR: YELLOW
CREAT BLD-MCNC: 1.06 MG/DL
DEPRECATED RDW RBC AUTO: 48.3 FL (ref 35.1–46.3)
EOSINOPHIL # BLD AUTO: 0.12 X10(3) UL (ref 0–0.7)
EOSINOPHIL NFR BLD AUTO: 3.8 %
ERYTHROCYTE [DISTWIDTH] IN BLOOD BY AUTOMATED COUNT: 13.2 % (ref 11–15)
GLOBULIN PLAS-MCNC: 2.9 G/DL (ref 2.8–4.4)
GLUCOSE BLD-MCNC: 108 MG/DL (ref 70–99)
GLUCOSE UR-MCNC: NEGATIVE MG/DL
HCT VFR BLD AUTO: 35.9 %
HDLC SERPL-MCNC: 69 MG/DL (ref 40–59)
HGB BLD-MCNC: 11.5 G/DL
HGB UR QL STRIP.AUTO: NEGATIVE
IMM GRANULOCYTES # BLD AUTO: 0.01 X10(3) UL (ref 0–1)
IMM GRANULOCYTES NFR BLD: 0.3 %
KETONES UR-MCNC: NEGATIVE MG/DL
LDLC SERPL CALC-MCNC: 69 MG/DL (ref ?–100)
LEUKOCYTE ESTERASE UR QL STRIP.AUTO: NEGATIVE
LYMPHOCYTES # BLD AUTO: 0.48 X10(3) UL (ref 1–4)
LYMPHOCYTES NFR BLD AUTO: 15.1 %
M PROTEIN MFR SERPL ELPH: 6.6 G/DL (ref 6.4–8.2)
MCH RBC QN AUTO: 32 PG (ref 26–34)
MCHC RBC AUTO-ENTMCNC: 32 G/DL (ref 31–37)
MCV RBC AUTO: 100 FL
MONOCYTES # BLD AUTO: 0.33 X10(3) UL (ref 0.1–1)
MONOCYTES NFR BLD AUTO: 10.4 %
NEUTROPHILS # BLD AUTO: 2.21 X10 (3) UL (ref 1.5–7.7)
NEUTROPHILS # BLD AUTO: 2.21 X10(3) UL (ref 1.5–7.7)
NEUTROPHILS NFR BLD AUTO: 69.8 %
NITRITE UR QL STRIP.AUTO: NEGATIVE
NONHDLC SERPL-MCNC: 82 MG/DL (ref ?–130)
OSMOLALITY SERPL CALC.SUM OF ELEC: 297 MOSM/KG (ref 275–295)
PATIENT FASTING Y/N/NP: YES
PATIENT FASTING Y/N/NP: YES
PH UR: 6 [PH] (ref 5–8)
PLATELET # BLD AUTO: 158 10(3)UL (ref 150–450)
POTASSIUM SERPL-SCNC: 4.6 MMOL/L (ref 3.5–5.1)
PROT UR-MCNC: NEGATIVE MG/DL
PSA SERPL-MCNC: 0.03 NG/ML (ref ?–4)
RBC # BLD AUTO: 3.59 X10(6)UL
SODIUM SERPL-SCNC: 141 MMOL/L (ref 136–145)
SP GR UR STRIP: 1.01 (ref 1–1.03)
TRIGL SERPL-MCNC: 64 MG/DL (ref 30–149)
TSI SER-ACNC: 1.56 MIU/ML (ref 0.36–3.74)
UROBILINOGEN UR STRIP-ACNC: <2
VLDLC SERPL CALC-MCNC: 10 MG/DL (ref 0–30)
WBC # BLD AUTO: 3.2 X10(3) UL (ref 4–11)

## 2021-07-13 PROCEDURE — 80053 COMPREHEN METABOLIC PANEL: CPT

## 2021-07-13 PROCEDURE — 36415 COLL VENOUS BLD VENIPUNCTURE: CPT

## 2021-07-13 PROCEDURE — 81003 URINALYSIS AUTO W/O SCOPE: CPT

## 2021-07-13 PROCEDURE — 85025 COMPLETE CBC W/AUTO DIFF WBC: CPT

## 2021-07-13 PROCEDURE — 84153 ASSAY OF PSA TOTAL: CPT

## 2021-07-13 PROCEDURE — 80061 LIPID PANEL: CPT

## 2021-07-13 PROCEDURE — 84443 ASSAY THYROID STIM HORMONE: CPT

## 2021-07-30 ENCOUNTER — OFFICE VISIT (OUTPATIENT)
Dept: HEMATOLOGY/ONCOLOGY | Facility: HOSPITAL | Age: 86
End: 2021-07-30
Attending: INTERNAL MEDICINE
Payer: MEDICARE

## 2021-07-30 VITALS
TEMPERATURE: 98 F | WEIGHT: 178 LBS | RESPIRATION RATE: 18 BRPM | OXYGEN SATURATION: 97 % | HEART RATE: 52 BPM | SYSTOLIC BLOOD PRESSURE: 129 MMHG | HEIGHT: 68 IN | BODY MASS INDEX: 26.98 KG/M2 | DIASTOLIC BLOOD PRESSURE: 58 MMHG

## 2021-07-30 DIAGNOSIS — D64.9 ANEMIA: ICD-10-CM

## 2021-07-30 DIAGNOSIS — D72.819 LEUKOPENIA: ICD-10-CM

## 2021-07-30 DIAGNOSIS — C43.9 MALIGNANT MELANOMA, UNSPECIFIED SITE (HCC): ICD-10-CM

## 2021-07-30 DIAGNOSIS — C61 PROSTATE CANCER (HCC): Primary | ICD-10-CM

## 2021-07-30 PROCEDURE — 99214 OFFICE O/P EST MOD 30 MIN: CPT | Performed by: INTERNAL MEDICINE

## 2021-07-30 RX ORDER — ACETAMINOPHEN 500 MG
500 TABLET ORAL EVERY 6 HOURS PRN
COMMUNITY

## 2021-07-30 NOTE — PROGRESS NOTES
Cancer Center Progress Note    Patient Name: Jon Parada   YOB: 1935   Medical Record Number: [id number]   Attending Physician: Uziel Valdez M.D.      Chief Complaint:  Melanoma, prostate cancer     History of Present Illness:  Cancer histo ([facility] 06/19/2018    [place] 4+5=9   • Visual impairment        Past Surgical History:  Past Surgical History:   Procedure Laterality Date   • [address] [facility] 2001    x2    • HERNIA SURGERY  1990    inguinal hernia   • OTHER Right     Carpal Tunnel Surg education level: Not on file    Tobacco Use      Smoking status: Never Smoker      Smokeless tobacco: Never Used    Vaping Use      Vaping Use: Never used    Substance and Sexual Activity      Alcohol use: Yes        Comment: daily      Drug use: No    Oth BY MOUTH ONE TIME DAILY  (Patient taking differently: 600 mg. 2 tablets In the p.m. ), Disp: 90 tablet, Rfl: 0  atorvastatin 40 MG Oral Tab, atorvastatin 40 mg tablet   Take 1 tablet every day by oral route., Disp: , Rfl:   DiphenhydrAMINE HCl (BENADRYL) 2 3.7 07/13/2021    GLOBULIN 2.9 07/13/2021    AGRATIO 1.4 08/11/2016     07/13/2021    K 4.6 07/13/2021     07/13/2021    CO2 24.0 07/13/2021       Radiology:  none    Cancer Staging  No matching staging information was found for the patient.

## 2021-12-07 ENCOUNTER — LAB ENCOUNTER (OUTPATIENT)
Dept: LAB | Facility: HOSPITAL | Age: 86
End: 2021-12-07
Attending: RADIOLOGY
Payer: MEDICARE

## 2021-12-07 DIAGNOSIS — D72.819 LEUKOPENIA: ICD-10-CM

## 2021-12-07 DIAGNOSIS — C61 PROSTATE CANCER (HCC): ICD-10-CM

## 2021-12-07 DIAGNOSIS — D64.9 ANEMIA: ICD-10-CM

## 2021-12-07 DIAGNOSIS — C43.9 MALIGNANT MELANOMA, UNSPECIFIED SITE (HCC): ICD-10-CM

## 2021-12-07 PROCEDURE — 84153 ASSAY OF PSA TOTAL: CPT

## 2021-12-07 PROCEDURE — 36415 COLL VENOUS BLD VENIPUNCTURE: CPT

## 2021-12-07 PROCEDURE — 84466 ASSAY OF TRANSFERRIN: CPT

## 2021-12-07 PROCEDURE — 82607 VITAMIN B-12: CPT

## 2021-12-07 PROCEDURE — 85025 COMPLETE CBC W/AUTO DIFF WBC: CPT

## 2021-12-07 PROCEDURE — 80053 COMPREHEN METABOLIC PANEL: CPT

## 2021-12-07 PROCEDURE — 83540 ASSAY OF IRON: CPT

## 2022-01-04 ENCOUNTER — OFFICE VISIT (OUTPATIENT)
Dept: HEMATOLOGY/ONCOLOGY | Facility: HOSPITAL | Age: 87
End: 2022-01-04
Attending: INTERNAL MEDICINE
Payer: MEDICARE

## 2022-01-04 VITALS
HEIGHT: 68 IN | RESPIRATION RATE: 18 BRPM | OXYGEN SATURATION: 98 % | HEART RATE: 59 BPM | SYSTOLIC BLOOD PRESSURE: 140 MMHG | DIASTOLIC BLOOD PRESSURE: 54 MMHG | TEMPERATURE: 98 F | WEIGHT: 179 LBS | BODY MASS INDEX: 27.13 KG/M2

## 2022-01-04 DIAGNOSIS — D64.9 ANEMIA, UNSPECIFIED TYPE: ICD-10-CM

## 2022-01-04 DIAGNOSIS — C61 PROSTATE CANCER (HCC): Primary | ICD-10-CM

## 2022-01-04 DIAGNOSIS — C43.9 MALIGNANT MELANOMA, UNSPECIFIED SITE (HCC): ICD-10-CM

## 2022-01-04 DIAGNOSIS — D70.8 OTHER NEUTROPENIA (HCC): ICD-10-CM

## 2022-01-04 PROCEDURE — 99214 OFFICE O/P EST MOD 30 MIN: CPT | Performed by: INTERNAL MEDICINE

## 2022-01-04 NOTE — PROGRESS NOTES
Cancer Center Progress Note    Patient Name: Bethel Amaral   YOB: 1935   Medical Record Number: [id number]   Attending Physician: Ruben Horan M.D.      Chief Complaint:  Melanoma, prostate cancer     History of Present Illness:  Cancer histo ([facility] 06/19/2018    [place] 4+5=9   • Visual impairment        Past Surgical History:  Past Surgical History:   Procedure Laterality Date   • [address] [facility], 2001    x2    • HERNIA SURGERY  1990    inguinal hernia   • OTHER Right     Carpal Tunnel Surg used    Substance and Sexual Activity      Alcohol use: Yes        Comment: daily      Drug use: No    Other Topics      Concerns:        Caffeine Concern: Yes          2-3 cup of coffee daily        Current Medications:    Current Outpatient Medications: 40 mg tablet   Take 1 tablet every day by oral route., Disp: , Rfl:   DiphenhydrAMINE HCl (BENADRYL) 25 MG Oral Cap, Take 25 mg by mouth every 6 (six) hours as needed for Itching., Disp: , Rfl:   Metoprolol Tartrate (LOPRESSOR) 50 MG Oral Tab, Take 1 table Radiology:  none    Cancer Staging  No matching staging information was found for the patient.     Impression and Plan:  82year old   male with a history of localized prostate cancer status post external beam radiotherapy and ADT for clinical stage T

## 2022-05-02 ENCOUNTER — OFFICE VISIT (OUTPATIENT)
Dept: OTOLARYNGOLOGY | Facility: CLINIC | Age: 87
End: 2022-05-02
Payer: MEDICARE

## 2022-05-02 DIAGNOSIS — H60.331 ACUTE SWIMMER'S EAR OF RIGHT SIDE: Primary | ICD-10-CM

## 2022-05-02 PROCEDURE — 92504 EAR MICROSCOPY EXAMINATION: CPT | Performed by: OTOLARYNGOLOGY

## 2022-05-02 PROCEDURE — 99213 OFFICE O/P EST LOW 20 MIN: CPT | Performed by: OTOLARYNGOLOGY

## 2022-05-02 RX ORDER — NEOMYCIN SULFATE, POLYMYXIN B SULFATE AND HYDROCORTISONE 10; 3.5; 1 MG/ML; MG/ML; [USP'U]/ML
3 SUSPENSION/ DROPS AURICULAR (OTIC) 3 TIMES DAILY
Qty: 10 ML | Refills: 1 | Status: SHIPPED | OUTPATIENT
Start: 2022-05-02 | End: 2022-05-12

## 2022-05-09 ENCOUNTER — APPOINTMENT (OUTPATIENT)
Dept: ULTRASOUND IMAGING | Age: 87
End: 2022-05-09
Attending: EMERGENCY MEDICINE
Payer: MEDICARE

## 2022-05-09 ENCOUNTER — HOSPITAL ENCOUNTER (OUTPATIENT)
Age: 87
Discharge: HOME | End: 2022-05-09
Attending: EMERGENCY MEDICINE
Payer: MEDICARE

## 2022-05-09 VITALS
TEMPERATURE: 97 F | HEART RATE: 53 BPM | SYSTOLIC BLOOD PRESSURE: 154 MMHG | DIASTOLIC BLOOD PRESSURE: 68 MMHG | RESPIRATION RATE: 16 BRPM | OXYGEN SATURATION: 99 %

## 2022-05-09 DIAGNOSIS — L03.116 CELLULITIS OF LEFT LOWER EXTREMITY: Primary | ICD-10-CM

## 2022-05-09 LAB
#MXD IC: 0.5 X10ˆ3/UL (ref 0.1–1)
BUN BLD-MCNC: 22 MG/DL (ref 7–18)
CHLORIDE BLD-SCNC: 104 MMOL/L (ref 98–112)
CO2 BLD-SCNC: 27 MMOL/L (ref 21–32)
CREAT BLD-MCNC: 1 MG/DL
DDIMER WHOLE BLOOD: 510 NG/ML DDU (ref ?–400)
GLUCOSE BLD-MCNC: 105 MG/DL (ref 70–99)
HCT VFR BLD AUTO: 35.1 %
HCT VFR BLD CALC: 35 %
HGB BLD-MCNC: 11.3 G/DL
ISTAT IONIZED CALCIUM FOR CHEM 8: 1.27 MMOL/L (ref 1.12–1.32)
LYMPHOCYTES # BLD AUTO: 0.7 X10ˆ3/UL (ref 1–4)
LYMPHOCYTES NFR BLD AUTO: 19 %
MCH RBC QN AUTO: 31.9 PG (ref 26–34)
MCHC RBC AUTO-ENTMCNC: 32.2 G/DL (ref 31–37)
MCV RBC AUTO: 99.2 FL (ref 80–100)
MIXED CELL %: 12.9 %
NEUTROPHILS # BLD AUTO: 2.5 X10ˆ3/UL (ref 1.5–7.7)
NEUTROPHILS NFR BLD AUTO: 68.1 %
PLATELET # BLD AUTO: 183 X10ˆ3/UL (ref 150–450)
POTASSIUM BLD-SCNC: 4.5 MMOL/L (ref 3.6–5.1)
RBC # BLD AUTO: 3.54 X10ˆ6/UL
SODIUM BLD-SCNC: 139 MMOL/L (ref 136–145)
WBC # BLD AUTO: 3.7 X10ˆ3/UL (ref 4–11)

## 2022-05-09 PROCEDURE — 93971 EXTREMITY STUDY: CPT | Performed by: EMERGENCY MEDICINE

## 2022-05-09 PROCEDURE — 36415 COLL VENOUS BLD VENIPUNCTURE: CPT

## 2022-05-09 PROCEDURE — 99214 OFFICE O/P EST MOD 30 MIN: CPT

## 2022-05-09 PROCEDURE — 80047 BASIC METABLC PNL IONIZED CA: CPT

## 2022-05-09 PROCEDURE — 87077 CULTURE AEROBIC IDENTIFY: CPT | Performed by: EMERGENCY MEDICINE

## 2022-05-09 PROCEDURE — 99215 OFFICE O/P EST HI 40 MIN: CPT

## 2022-05-09 PROCEDURE — 85025 COMPLETE CBC W/AUTO DIFF WBC: CPT | Performed by: EMERGENCY MEDICINE

## 2022-05-09 PROCEDURE — 85378 FIBRIN DEGRADE SEMIQUANT: CPT | Performed by: EMERGENCY MEDICINE

## 2022-05-09 PROCEDURE — 87205 SMEAR GRAM STAIN: CPT | Performed by: EMERGENCY MEDICINE

## 2022-05-09 PROCEDURE — 87186 SC STD MICRODIL/AGAR DIL: CPT | Performed by: EMERGENCY MEDICINE

## 2022-05-09 PROCEDURE — 87070 CULTURE OTHR SPECIMN AEROBIC: CPT | Performed by: EMERGENCY MEDICINE

## 2022-05-09 RX ORDER — CLINDAMYCIN HYDROCHLORIDE 150 MG/1
450 CAPSULE ORAL 3 TIMES DAILY
Qty: 90 CAPSULE | Refills: 0 | Status: SHIPPED | OUTPATIENT
Start: 2022-05-09 | End: 2022-05-19

## 2022-05-09 NOTE — ED QUICK NOTES
Pt back from [address], resting in room. 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

## 2022-05-09 NOTE — ED INITIAL ASSESSMENT (HPI)
Pt comes in for eval of L leg. Reports having some pain in the leg for 1 weeks ago Saturday, reports that swelling began  On Monday. His LLE is mildly swollen and he has several small abrasions to shin with some erythema. Denies sob, cp, fever. Full rom, NV intact.

## 2022-06-01 ENCOUNTER — LAB ENCOUNTER (OUTPATIENT)
Dept: LAB | Facility: HOSPITAL | Age: 87
End: 2022-06-01
Attending: INTERNAL MEDICINE
Payer: MEDICARE

## 2022-06-01 DIAGNOSIS — C61 PROSTATE CANCER (HCC): ICD-10-CM

## 2022-06-01 DIAGNOSIS — D70.8 OTHER NEUTROPENIA (HCC): ICD-10-CM

## 2022-06-01 DIAGNOSIS — C43.9 MALIGNANT MELANOMA, UNSPECIFIED SITE (HCC): ICD-10-CM

## 2022-06-01 LAB
BASOPHILS # BLD AUTO: 0.01 X10(3) UL (ref 0–0.2)
BASOPHILS NFR BLD AUTO: 0.3 %
DEPRECATED RDW RBC AUTO: 47.3 FL (ref 35.1–46.3)
EOSINOPHIL # BLD AUTO: 0.12 X10(3) UL (ref 0–0.7)
EOSINOPHIL NFR BLD AUTO: 3.9 %
ERYTHROCYTE [DISTWIDTH] IN BLOOD BY AUTOMATED COUNT: 12.8 % (ref 11–15)
HCT VFR BLD AUTO: 35.6 %
HGB BLD-MCNC: 11.3 G/DL
IMM GRANULOCYTES # BLD AUTO: 0.01 X10(3) UL (ref 0–1)
IMM GRANULOCYTES NFR BLD: 0.3 %
LYMPHOCYTES # BLD AUTO: 0.68 X10(3) UL (ref 1–4)
LYMPHOCYTES NFR BLD AUTO: 22 %
MCH RBC QN AUTO: 32 PG (ref 26–34)
MCHC RBC AUTO-ENTMCNC: 31.7 G/DL (ref 31–37)
MCV RBC AUTO: 100.8 FL
MONOCYTES # BLD AUTO: 0.3 X10(3) UL (ref 0.1–1)
MONOCYTES NFR BLD AUTO: 9.7 %
NEUTROPHILS # BLD AUTO: 1.97 X10 (3) UL (ref 1.5–7.7)
NEUTROPHILS # BLD AUTO: 1.97 X10(3) UL (ref 1.5–7.7)
NEUTROPHILS NFR BLD AUTO: 63.8 %
PLATELET # BLD AUTO: 161 10(3)UL (ref 150–450)
PSA SERPL-MCNC: 0.08 NG/ML (ref ?–4)
RBC # BLD AUTO: 3.53 X10(6)UL
WBC # BLD AUTO: 3.1 X10(3) UL (ref 4–11)

## 2022-06-01 PROCEDURE — 85025 COMPLETE CBC W/AUTO DIFF WBC: CPT

## 2022-06-01 PROCEDURE — 36415 COLL VENOUS BLD VENIPUNCTURE: CPT

## 2022-06-01 PROCEDURE — 84153 ASSAY OF PSA TOTAL: CPT

## 2022-06-07 ENCOUNTER — OFFICE VISIT (OUTPATIENT)
Dept: HEMATOLOGY/ONCOLOGY | Facility: HOSPITAL | Age: 87
End: 2022-06-07
Attending: INTERNAL MEDICINE
Payer: MEDICARE

## 2022-06-07 VITALS
TEMPERATURE: 98 F | HEIGHT: 68 IN | SYSTOLIC BLOOD PRESSURE: 130 MMHG | WEIGHT: 180 LBS | OXYGEN SATURATION: 98 % | DIASTOLIC BLOOD PRESSURE: 53 MMHG | HEART RATE: 54 BPM | BODY MASS INDEX: 27.28 KG/M2 | RESPIRATION RATE: 18 BRPM

## 2022-06-07 DIAGNOSIS — C61 PROSTATE CANCER (HCC): Primary | ICD-10-CM

## 2022-06-07 DIAGNOSIS — C43.9 MALIGNANT MELANOMA, UNSPECIFIED SITE (HCC): ICD-10-CM

## 2022-06-07 DIAGNOSIS — D70.8 OTHER NEUTROPENIA (HCC): ICD-10-CM

## 2022-06-07 PROCEDURE — 99214 OFFICE O/P EST MOD 30 MIN: CPT | Performed by: INTERNAL MEDICINE

## 2022-10-11 ENCOUNTER — LAB ENCOUNTER (OUTPATIENT)
Dept: LAB | Facility: HOSPITAL | Age: 87
End: 2022-10-11
Attending: INTERNAL MEDICINE
Payer: MEDICARE

## 2022-10-11 ENCOUNTER — OFFICE VISIT (OUTPATIENT)
Dept: HEMATOLOGY/ONCOLOGY | Facility: HOSPITAL | Age: 87
End: 2022-10-11
Attending: INTERNAL MEDICINE
Payer: MEDICARE

## 2022-10-11 VITALS
BODY MASS INDEX: 27.13 KG/M2 | SYSTOLIC BLOOD PRESSURE: 128 MMHG | OXYGEN SATURATION: 96 % | DIASTOLIC BLOOD PRESSURE: 58 MMHG | TEMPERATURE: 98 F | HEART RATE: 50 BPM | HEIGHT: 68 IN | WEIGHT: 179 LBS | RESPIRATION RATE: 18 BRPM

## 2022-10-11 DIAGNOSIS — C61 PROSTATE CANCER (HCC): Primary | ICD-10-CM

## 2022-10-11 DIAGNOSIS — D72.819 LEUKOPENIA, UNSPECIFIED TYPE: ICD-10-CM

## 2022-10-11 DIAGNOSIS — D64.9 ANEMIA, UNSPECIFIED TYPE: ICD-10-CM

## 2022-10-11 DIAGNOSIS — C43.9 MALIGNANT MELANOMA, UNSPECIFIED SITE (HCC): ICD-10-CM

## 2022-10-11 DIAGNOSIS — D70.8 OTHER NEUTROPENIA (HCC): ICD-10-CM

## 2022-10-11 LAB
BASOPHILS # BLD AUTO: 0.01 X10(3) UL (ref 0–0.2)
BASOPHILS NFR BLD AUTO: 0.3 %
DEPRECATED HBV CORE AB SER IA-ACNC: 86.7 NG/ML
DEPRECATED RDW RBC AUTO: 49.1 FL (ref 35.1–46.3)
EOSINOPHIL # BLD AUTO: 0.15 X10(3) UL (ref 0–0.7)
EOSINOPHIL NFR BLD AUTO: 5 %
ERYTHROCYTE [DISTWIDTH] IN BLOOD BY AUTOMATED COUNT: 13.1 % (ref 11–15)
HCT VFR BLD AUTO: 37.3 %
HGB BLD-MCNC: 12 G/DL
IMM GRANULOCYTES # BLD AUTO: 0.01 X10(3) UL (ref 0–1)
IMM GRANULOCYTES NFR BLD: 0.3 %
IRON SATN MFR SERPL: 21 %
IRON SERPL-MCNC: 71 UG/DL
LYMPHOCYTES # BLD AUTO: 0.63 X10(3) UL (ref 1–4)
LYMPHOCYTES NFR BLD AUTO: 20.9 %
MCH RBC QN AUTO: 32.5 PG (ref 26–34)
MCHC RBC AUTO-ENTMCNC: 32.2 G/DL (ref 31–37)
MCV RBC AUTO: 101.1 FL
MONOCYTES # BLD AUTO: 0.33 X10(3) UL (ref 0.1–1)
MONOCYTES NFR BLD AUTO: 10.9 %
NEUTROPHILS # BLD AUTO: 1.89 X10 (3) UL (ref 1.5–7.7)
NEUTROPHILS # BLD AUTO: 1.89 X10(3) UL (ref 1.5–7.7)
NEUTROPHILS NFR BLD AUTO: 62.6 %
PLATELET # BLD AUTO: 163 10(3)UL (ref 150–450)
RBC # BLD AUTO: 3.69 X10(6)UL
TIBC SERPL-MCNC: 332 UG/DL (ref 240–450)
TRANSFERRIN SERPL-MCNC: 223 MG/DL (ref 200–360)
VIT B12 SERPL-MCNC: 374 PG/ML (ref 193–986)
WBC # BLD AUTO: 3 X10(3) UL (ref 4–11)

## 2022-10-11 PROCEDURE — 84466 ASSAY OF TRANSFERRIN: CPT

## 2022-10-11 PROCEDURE — 82728 ASSAY OF FERRITIN: CPT

## 2022-10-11 PROCEDURE — 82607 VITAMIN B-12: CPT

## 2022-10-11 PROCEDURE — 36415 COLL VENOUS BLD VENIPUNCTURE: CPT

## 2022-10-11 PROCEDURE — 99214 OFFICE O/P EST MOD 30 MIN: CPT | Performed by: INTERNAL MEDICINE

## 2022-10-11 PROCEDURE — 83540 ASSAY OF IRON: CPT

## 2022-10-11 PROCEDURE — 85025 COMPLETE CBC W/AUTO DIFF WBC: CPT

## 2023-07-13 ENCOUNTER — LAB ENCOUNTER (OUTPATIENT)
Dept: LAB | Facility: HOSPITAL | Age: 88
End: 2023-07-13
Attending: INTERNAL MEDICINE
Payer: MEDICARE

## 2023-07-13 DIAGNOSIS — E78.00 PURE HYPERCHOLESTEROLEMIA: Primary | ICD-10-CM

## 2023-07-13 DIAGNOSIS — R73.01 IMPAIRED FASTING GLUCOSE: ICD-10-CM

## 2023-07-13 LAB
ALT SERPL-CCNC: 24 U/L
ANION GAP SERPL CALC-SCNC: 6 MMOL/L (ref 0–18)
BASOPHILS # BLD AUTO: 0.02 X10(3) UL (ref 0–0.2)
BASOPHILS NFR BLD AUTO: 0.4 %
BUN BLD-MCNC: 23 MG/DL (ref 7–18)
BUN/CREAT SERPL: 22.3 (ref 10–20)
CALCIUM BLD-MCNC: 9.1 MG/DL (ref 8.5–10.1)
CHLORIDE SERPL-SCNC: 109 MMOL/L (ref 98–112)
CHOLEST SERPL-MCNC: 163 MG/DL (ref ?–200)
CO2 SERPL-SCNC: 26 MMOL/L (ref 21–32)
CREAT BLD-MCNC: 1.03 MG/DL
DEPRECATED RDW RBC AUTO: 50.6 FL (ref 35.1–46.3)
EOSINOPHIL # BLD AUTO: 0.12 X10(3) UL (ref 0–0.7)
EOSINOPHIL NFR BLD AUTO: 2.5 %
ERYTHROCYTE [DISTWIDTH] IN BLOOD BY AUTOMATED COUNT: 13.6 % (ref 11–15)
EST. AVERAGE GLUCOSE BLD GHB EST-MCNC: 120 MG/DL (ref 68–126)
FASTING PATIENT LIPID ANSWER: YES
FASTING STATUS PATIENT QL REPORTED: YES
GFR SERPLBLD BASED ON 1.73 SQ M-ARVRAT: 71 ML/MIN/1.73M2 (ref 60–?)
GLUCOSE BLD-MCNC: 80 MG/DL (ref 70–99)
HBA1C MFR BLD: 5.8 % (ref ?–5.7)
HCT VFR BLD AUTO: 35.6 %
HDLC SERPL-MCNC: 82 MG/DL (ref 40–59)
HGB BLD-MCNC: 11.5 G/DL
IMM GRANULOCYTES # BLD AUTO: 0.02 X10(3) UL (ref 0–1)
IMM GRANULOCYTES NFR BLD: 0.4 %
LDLC SERPL CALC-MCNC: 63 MG/DL (ref ?–100)
LYMPHOCYTES # BLD AUTO: 0.93 X10(3) UL (ref 1–4)
LYMPHOCYTES NFR BLD AUTO: 19.5 %
MCH RBC QN AUTO: 32.4 PG (ref 26–34)
MCHC RBC AUTO-ENTMCNC: 32.3 G/DL (ref 31–37)
MCV RBC AUTO: 100.3 FL
MONOCYTES # BLD AUTO: 0.54 X10(3) UL (ref 0.1–1)
MONOCYTES NFR BLD AUTO: 11.3 %
NEUTROPHILS # BLD AUTO: 3.13 X10 (3) UL (ref 1.5–7.7)
NEUTROPHILS # BLD AUTO: 3.13 X10(3) UL (ref 1.5–7.7)
NEUTROPHILS NFR BLD AUTO: 65.9 %
NONHDLC SERPL-MCNC: 81 MG/DL (ref ?–130)
OSMOLALITY SERPL CALC.SUM OF ELEC: 295 MOSM/KG (ref 275–295)
PLATELET # BLD AUTO: 177 10(3)UL (ref 150–450)
POTASSIUM SERPL-SCNC: 4.8 MMOL/L (ref 3.5–5.1)
RBC # BLD AUTO: 3.55 X10(6)UL
SODIUM SERPL-SCNC: 141 MMOL/L (ref 136–145)
TRIGL SERPL-MCNC: 102 MG/DL (ref 30–149)
VLDLC SERPL CALC-MCNC: 15 MG/DL (ref 0–30)
WBC # BLD AUTO: 4.8 X10(3) UL (ref 4–11)

## 2023-07-13 PROCEDURE — 84460 ALANINE AMINO (ALT) (SGPT): CPT

## 2023-07-13 PROCEDURE — 83036 HEMOGLOBIN GLYCOSYLATED A1C: CPT

## 2023-07-13 PROCEDURE — 36415 COLL VENOUS BLD VENIPUNCTURE: CPT

## 2023-07-13 PROCEDURE — 80048 BASIC METABOLIC PNL TOTAL CA: CPT

## 2023-07-13 PROCEDURE — 85025 COMPLETE CBC W/AUTO DIFF WBC: CPT

## 2023-07-13 PROCEDURE — 80061 LIPID PANEL: CPT

## 2023-08-07 ENCOUNTER — APPOINTMENT (OUTPATIENT)
Dept: GENERAL RADIOLOGY | Age: 88
End: 2023-08-07
Attending: NURSE PRACTITIONER
Payer: MEDICARE

## 2023-08-07 ENCOUNTER — HOSPITAL ENCOUNTER (OUTPATIENT)
Age: 88
Discharge: HOME | End: 2023-08-07
Payer: MEDICARE

## 2023-08-07 VITALS
TEMPERATURE: 97 F | DIASTOLIC BLOOD PRESSURE: 62 MMHG | SYSTOLIC BLOOD PRESSURE: 163 MMHG | OXYGEN SATURATION: 97 % | RESPIRATION RATE: 16 BRPM | HEART RATE: 50 BPM

## 2023-08-07 DIAGNOSIS — S49.91XA INJURY OF RIGHT SHOULDER, INITIAL ENCOUNTER: Primary | ICD-10-CM

## 2023-08-07 PROCEDURE — 99214 OFFICE O/P EST MOD 30 MIN: CPT

## 2023-08-07 PROCEDURE — 73030 X-RAY EXAM OF SHOULDER: CPT | Performed by: NURSE PRACTITIONER

## 2023-08-07 PROCEDURE — 99213 OFFICE O/P EST LOW 20 MIN: CPT

## 2023-08-07 NOTE — ED INITIAL ASSESSMENT (HPI)
Pt presents with right frontal shoulder pain x 2 days. Pt reports swinging golf club Saturday and struck the grass. Pain ever since.      Pt took Tylenol and Gabapentin last night, Tylenol taken today in am.

## 2023-10-12 ENCOUNTER — APPOINTMENT (OUTPATIENT)
Dept: HEMATOLOGY/ONCOLOGY | Facility: HOSPITAL | Age: 88
End: 2023-10-12
Attending: INTERNAL MEDICINE
Payer: MEDICARE

## 2023-10-13 ENCOUNTER — OFFICE VISIT (OUTPATIENT)
Dept: HEMATOLOGY/ONCOLOGY | Facility: HOSPITAL | Age: 88
End: 2023-10-13
Attending: INTERNAL MEDICINE
Payer: MEDICARE

## 2023-10-13 VITALS
HEIGHT: 68 IN | WEIGHT: 179 LBS | OXYGEN SATURATION: 98 % | TEMPERATURE: 98 F | RESPIRATION RATE: 18 BRPM | HEART RATE: 58 BPM | BODY MASS INDEX: 27.13 KG/M2 | DIASTOLIC BLOOD PRESSURE: 60 MMHG | SYSTOLIC BLOOD PRESSURE: 139 MMHG

## 2023-10-13 DIAGNOSIS — C61 PROSTATE CANCER (HCC): Primary | ICD-10-CM

## 2023-10-13 DIAGNOSIS — D64.9 ANEMIA, UNSPECIFIED TYPE: ICD-10-CM

## 2023-10-13 DIAGNOSIS — D72.819 LEUKOPENIA, UNSPECIFIED TYPE: ICD-10-CM

## 2023-10-13 PROCEDURE — 99214 OFFICE O/P EST MOD 30 MIN: CPT | Performed by: INTERNAL MEDICINE

## 2023-12-18 ENCOUNTER — LAB ENCOUNTER (OUTPATIENT)
Dept: LAB | Facility: HOSPITAL | Age: 88
End: 2023-12-18
Attending: INTERNAL MEDICINE
Payer: MEDICARE

## 2023-12-18 DIAGNOSIS — E78.00 PURE HYPERCHOLESTEROLEMIA: Primary | ICD-10-CM

## 2023-12-18 DIAGNOSIS — R73.01 IMPAIRED FASTING GLUCOSE: ICD-10-CM

## 2023-12-18 DIAGNOSIS — R35.89 POLYURIA: ICD-10-CM

## 2023-12-18 LAB
ALBUMIN SERPL-MCNC: 4.1 G/DL (ref 3.2–4.8)
ALBUMIN/GLOB SERPL: 1.7 {RATIO} (ref 1–2)
ALP LIVER SERPL-CCNC: 71 U/L
ALT SERPL-CCNC: 15 U/L
ANION GAP SERPL CALC-SCNC: 2 MMOL/L (ref 0–18)
AST SERPL-CCNC: 28 U/L (ref ?–34)
BASOPHILS # BLD AUTO: 0.01 X10(3) UL (ref 0–0.2)
BASOPHILS NFR BLD AUTO: 0.3 %
BILIRUB SERPL-MCNC: 1.3 MG/DL (ref 0.2–1.1)
BILIRUB UR QL: NEGATIVE
BUN BLD-MCNC: 19 MG/DL (ref 9–23)
BUN/CREAT SERPL: 17.8 (ref 10–20)
CALCIUM BLD-MCNC: 9.7 MG/DL (ref 8.7–10.4)
CHLORIDE SERPL-SCNC: 110 MMOL/L (ref 98–112)
CHOLEST SERPL-MCNC: 177 MG/DL (ref ?–200)
CLARITY UR: CLEAR
CO2 SERPL-SCNC: 28 MMOL/L (ref 21–32)
COLOR UR: COLORLESS
CREAT BLD-MCNC: 1.07 MG/DL
DEPRECATED RDW RBC AUTO: 47.5 FL (ref 35.1–46.3)
EGFRCR SERPLBLD CKD-EPI 2021: 68 ML/MIN/1.73M2 (ref 60–?)
EOSINOPHIL # BLD AUTO: 0.04 X10(3) UL (ref 0–0.7)
EOSINOPHIL NFR BLD AUTO: 1 %
ERYTHROCYTE [DISTWIDTH] IN BLOOD BY AUTOMATED COUNT: 13.1 % (ref 11–15)
EST. AVERAGE GLUCOSE BLD GHB EST-MCNC: 123 MG/DL (ref 68–126)
FASTING PATIENT LIPID ANSWER: YES
FASTING STATUS PATIENT QL REPORTED: YES
GLOBULIN PLAS-MCNC: 2.4 G/DL (ref 2.8–4.4)
GLUCOSE BLD-MCNC: 110 MG/DL (ref 70–99)
GLUCOSE UR-MCNC: NORMAL MG/DL
HBA1C MFR BLD: 5.9 % (ref ?–5.7)
HCT VFR BLD AUTO: 37.5 %
HDLC SERPL-MCNC: 78 MG/DL (ref 40–59)
HGB BLD-MCNC: 11.9 G/DL
HGB UR QL STRIP.AUTO: NEGATIVE
IMM GRANULOCYTES # BLD AUTO: 0.01 X10(3) UL (ref 0–1)
IMM GRANULOCYTES NFR BLD: 0.3 %
KETONES UR-MCNC: NEGATIVE MG/DL
LDLC SERPL CALC-MCNC: 87 MG/DL (ref ?–100)
LEUKOCYTE ESTERASE UR QL STRIP.AUTO: NEGATIVE
LYMPHOCYTES # BLD AUTO: 0.96 X10(3) UL (ref 1–4)
LYMPHOCYTES NFR BLD AUTO: 24 %
MCH RBC QN AUTO: 31.4 PG (ref 26–34)
MCHC RBC AUTO-ENTMCNC: 31.7 G/DL (ref 31–37)
MCV RBC AUTO: 98.9 FL
MONOCYTES # BLD AUTO: 0.37 X10(3) UL (ref 0.1–1)
MONOCYTES NFR BLD AUTO: 9.3 %
NEUTROPHILS # BLD AUTO: 2.61 X10 (3) UL (ref 1.5–7.7)
NEUTROPHILS # BLD AUTO: 2.61 X10(3) UL (ref 1.5–7.7)
NEUTROPHILS NFR BLD AUTO: 65.1 %
NITRITE UR QL STRIP.AUTO: NEGATIVE
NONHDLC SERPL-MCNC: 99 MG/DL (ref ?–130)
OSMOLALITY SERPL CALC.SUM OF ELEC: 293 MOSM/KG (ref 275–295)
PH UR: 5.5 [PH] (ref 5–8)
PLATELET # BLD AUTO: 182 10(3)UL (ref 150–450)
POTASSIUM SERPL-SCNC: 4.9 MMOL/L (ref 3.5–5.1)
PROT SERPL-MCNC: 6.5 G/DL (ref 5.7–8.2)
PROT UR-MCNC: NEGATIVE MG/DL
RBC # BLD AUTO: 3.79 X10(6)UL
SODIUM SERPL-SCNC: 140 MMOL/L (ref 136–145)
SP GR UR STRIP: 1.01 (ref 1–1.03)
TRIGL SERPL-MCNC: 61 MG/DL (ref 30–149)
TSI SER-ACNC: 1.28 MIU/ML (ref 0.55–4.78)
UROBILINOGEN UR STRIP-ACNC: NORMAL
VLDLC SERPL CALC-MCNC: 10 MG/DL (ref 0–30)
WBC # BLD AUTO: 4 X10(3) UL (ref 4–11)

## 2023-12-18 PROCEDURE — 83036 HEMOGLOBIN GLYCOSYLATED A1C: CPT

## 2023-12-18 PROCEDURE — 80061 LIPID PANEL: CPT

## 2023-12-18 PROCEDURE — 80053 COMPREHEN METABOLIC PANEL: CPT

## 2023-12-18 PROCEDURE — 85025 COMPLETE CBC W/AUTO DIFF WBC: CPT

## 2023-12-18 PROCEDURE — 36415 COLL VENOUS BLD VENIPUNCTURE: CPT

## 2023-12-18 PROCEDURE — 84443 ASSAY THYROID STIM HORMONE: CPT

## 2023-12-18 PROCEDURE — 81003 URINALYSIS AUTO W/O SCOPE: CPT

## 2024-04-10 ENCOUNTER — NEW PATIENT (OUTPATIENT)
Dept: URBAN - METROPOLITAN AREA CLINIC 32 | Facility: CLINIC | Age: 89
End: 2024-04-10

## 2024-04-10 DIAGNOSIS — Z96.1: ICD-10-CM

## 2024-04-10 DIAGNOSIS — H16.223: ICD-10-CM

## 2024-04-10 DIAGNOSIS — H57.813: ICD-10-CM

## 2024-04-10 DIAGNOSIS — H26.492: ICD-10-CM

## 2024-04-10 DIAGNOSIS — H35.033: ICD-10-CM

## 2024-04-10 DIAGNOSIS — H02.834: ICD-10-CM

## 2024-04-10 DIAGNOSIS — H40.023: ICD-10-CM

## 2024-04-10 DIAGNOSIS — H02.831: ICD-10-CM

## 2024-04-10 PROCEDURE — 92250 FUNDUS PHOTOGRAPHY W/I&R: CPT

## 2024-04-10 PROCEDURE — 92015 DETERMINE REFRACTIVE STATE: CPT

## 2024-04-10 PROCEDURE — 99204 OFFICE O/P NEW MOD 45 MIN: CPT

## 2024-04-10 ASSESSMENT — VISUAL ACUITY
OS_CC: J1+
OS_GLARE: 20/80
OD_SC: 20/30
OD_SC: J1+
OD_GLARE: 20/50
OS_SC: J10
OS_CC: 20/25-2
OS_SC: 20/50-1
OS_PH: 20/30-2
OD_CC: 20/25-1
OD_CC: J1+

## 2024-04-10 ASSESSMENT — KERATOMETRY
OD_AXISANGLE_DEGREES: 174
OD_K1POWER_DIOPTERS: 42.75
OS_AXISANGLE_DEGREES: 1
OS_K1POWER_DIOPTERS: 42.75
OS_AXISANGLE2_DEGREES: 91
OD_AXISANGLE2_DEGREES: 84
OD_K2POWER_DIOPTERS: 40.75
OS_K2POWER_DIOPTERS: 41.00

## 2024-04-10 ASSESSMENT — TONOMETRY
OS_IOP_MMHG: 13
OD_IOP_MMHG: 13

## 2024-04-15 ENCOUNTER — SURGERY/PROCEDURE (OUTPATIENT)
Facility: LOCATION | Age: 89
End: 2024-04-15

## 2024-04-15 DIAGNOSIS — H26.492: ICD-10-CM

## 2024-04-15 PROCEDURE — 66821 AFTER CATARACT LASER SURGERY: CPT

## 2024-04-18 ENCOUNTER — FOLLOW UP (OUTPATIENT)
Dept: URBAN - METROPOLITAN AREA CLINIC 32 | Facility: CLINIC | Age: 89
End: 2024-04-18

## 2024-04-18 DIAGNOSIS — H40.1113: ICD-10-CM

## 2024-04-18 DIAGNOSIS — H40.1122: ICD-10-CM

## 2024-04-18 DIAGNOSIS — H16.223: ICD-10-CM

## 2024-04-18 DIAGNOSIS — Z98.890: ICD-10-CM

## 2024-04-18 PROCEDURE — 92083 EXTENDED VISUAL FIELD XM: CPT

## 2024-04-18 PROCEDURE — 99213 OFFICE O/P EST LOW 20 MIN: CPT

## 2024-04-18 PROCEDURE — 92133 CPTRZD OPH DX IMG PST SGM ON: CPT

## 2024-04-18 RX ORDER — BIMATOPROST 0.1 MG/ML: 1 SOLUTION/ DROPS OPHTHALMIC EVERY EVENING

## 2024-04-18 ASSESSMENT — KERATOMETRY
OS_AXISANGLE_DEGREES: 1
OD_K2POWER_DIOPTERS: 40.75
OD_AXISANGLE_DEGREES: 174
OS_AXISANGLE2_DEGREES: 91
OD_AXISANGLE2_DEGREES: 84
OS_K1POWER_DIOPTERS: 42.75
OD_K1POWER_DIOPTERS: 42.75
OS_K2POWER_DIOPTERS: 41.00

## 2024-04-18 ASSESSMENT — TONOMETRY
OD_IOP_MMHG: 14
OS_IOP_MMHG: 12

## 2024-04-18 ASSESSMENT — VISUAL ACUITY
OD_SC: 20/25-1
OS_SC: 20/30

## 2024-04-20 ASSESSMENT — KERATOMETRY
OD_K1POWER_DIOPTERS: 42.75
OS_AXISANGLE_DEGREES: 1
OS_K1POWER_DIOPTERS: 42.75
OD_K2POWER_DIOPTERS: 40.75
OD_AXISANGLE_DEGREES: 174
OD_AXISANGLE2_DEGREES: 84
OS_AXISANGLE2_DEGREES: 91
OS_K2POWER_DIOPTERS: 41.00

## 2024-06-04 ENCOUNTER — APPOINTMENT (OUTPATIENT)
Dept: HEMATOLOGY/ONCOLOGY | Facility: HOSPITAL | Age: 89
End: 2024-06-04
Attending: INTERNAL MEDICINE
Payer: MEDICARE

## 2024-06-10 ENCOUNTER — LAB ENCOUNTER (OUTPATIENT)
Dept: LAB | Facility: HOSPITAL | Age: 89
End: 2024-06-10
Attending: STUDENT IN AN ORGANIZED HEALTH CARE EDUCATION/TRAINING PROGRAM
Payer: MEDICARE

## 2024-06-10 ENCOUNTER — TELEPHONE (OUTPATIENT)
Dept: HEMATOLOGY/ONCOLOGY | Facility: HOSPITAL | Age: 89
End: 2024-06-10

## 2024-06-10 DIAGNOSIS — C61 PROSTATE CANCER (HCC): ICD-10-CM

## 2024-06-10 DIAGNOSIS — C43.9 MALIGNANT MELANOMA, UNSPECIFIED SITE (HCC): ICD-10-CM

## 2024-06-10 DIAGNOSIS — C43.9 MALIGNANT MELANOMA, UNSPECIFIED SITE (HCC): Primary | ICD-10-CM

## 2024-06-10 LAB
ALBUMIN SERPL-MCNC: 4.3 G/DL (ref 3.2–4.8)
ALBUMIN/GLOB SERPL: 1.8 {RATIO} (ref 1–2)
ALP LIVER SERPL-CCNC: 74 U/L
ALT SERPL-CCNC: 16 U/L
ANION GAP SERPL CALC-SCNC: 4 MMOL/L (ref 0–18)
AST SERPL-CCNC: 23 U/L (ref ?–34)
BASOPHILS # BLD AUTO: 0.01 X10(3) UL (ref 0–0.2)
BASOPHILS NFR BLD AUTO: 0.2 %
BILIRUB SERPL-MCNC: 1.1 MG/DL (ref 0.2–1.1)
BUN BLD-MCNC: 20 MG/DL (ref 9–23)
BUN/CREAT SERPL: 18.9 (ref 10–20)
CALCIUM BLD-MCNC: 9.9 MG/DL (ref 8.7–10.4)
CHLORIDE SERPL-SCNC: 110 MMOL/L (ref 98–112)
CO2 SERPL-SCNC: 28 MMOL/L (ref 21–32)
CREAT BLD-MCNC: 1.06 MG/DL
DEPRECATED RDW RBC AUTO: 50 FL (ref 35.1–46.3)
EGFRCR SERPLBLD CKD-EPI 2021: 68 ML/MIN/1.73M2 (ref 60–?)
EOSINOPHIL # BLD AUTO: 0.22 X10(3) UL (ref 0–0.7)
EOSINOPHIL NFR BLD AUTO: 4.8 %
ERYTHROCYTE [DISTWIDTH] IN BLOOD BY AUTOMATED COUNT: 13.3 % (ref 11–15)
FASTING STATUS PATIENT QL REPORTED: NO
GLOBULIN PLAS-MCNC: 2.4 G/DL (ref 2–3.5)
GLUCOSE BLD-MCNC: 94 MG/DL (ref 70–99)
HCT VFR BLD AUTO: 37.3 %
HGB BLD-MCNC: 12.3 G/DL
IMM GRANULOCYTES # BLD AUTO: 0.02 X10(3) UL (ref 0–1)
IMM GRANULOCYTES NFR BLD: 0.4 %
LYMPHOCYTES # BLD AUTO: 0.93 X10(3) UL (ref 1–4)
LYMPHOCYTES NFR BLD AUTO: 20.4 %
MCH RBC QN AUTO: 33.5 PG (ref 26–34)
MCHC RBC AUTO-ENTMCNC: 33 G/DL (ref 31–37)
MCV RBC AUTO: 101.6 FL
MONOCYTES # BLD AUTO: 0.46 X10(3) UL (ref 0.1–1)
MONOCYTES NFR BLD AUTO: 10.1 %
NEUTROPHILS # BLD AUTO: 2.92 X10 (3) UL (ref 1.5–7.7)
NEUTROPHILS # BLD AUTO: 2.92 X10(3) UL (ref 1.5–7.7)
NEUTROPHILS NFR BLD AUTO: 64.1 %
OSMOLALITY SERPL CALC.SUM OF ELEC: 296 MOSM/KG (ref 275–295)
PLATELET # BLD AUTO: 197 10(3)UL (ref 150–450)
POTASSIUM SERPL-SCNC: 4.8 MMOL/L (ref 3.5–5.1)
PROT SERPL-MCNC: 6.7 G/DL (ref 5.7–8.2)
PSA SERPL-MCNC: 0.21 NG/ML (ref ?–4)
RBC # BLD AUTO: 3.67 X10(6)UL
SODIUM SERPL-SCNC: 142 MMOL/L (ref 136–145)
WBC # BLD AUTO: 4.6 X10(3) UL (ref 4–11)

## 2024-06-10 PROCEDURE — 36415 COLL VENOUS BLD VENIPUNCTURE: CPT

## 2024-06-10 PROCEDURE — 84153 ASSAY OF PSA TOTAL: CPT

## 2024-06-10 PROCEDURE — 85025 COMPLETE CBC W/AUTO DIFF WBC: CPT

## 2024-06-10 PROCEDURE — 80053 COMPREHEN METABOLIC PANEL: CPT

## 2024-06-10 NOTE — TELEPHONE ENCOUNTER
Writer called patient as a reminder of appointment with Dr. Gallardo tomorrow. He will have his labs done this afternoon.

## 2024-06-11 ENCOUNTER — OFFICE VISIT (OUTPATIENT)
Dept: HEMATOLOGY/ONCOLOGY | Facility: HOSPITAL | Age: 89
End: 2024-06-11
Attending: INTERNAL MEDICINE
Payer: MEDICARE

## 2024-06-11 VITALS
DIASTOLIC BLOOD PRESSURE: 71 MMHG | OXYGEN SATURATION: 96 % | WEIGHT: 180.38 LBS | HEART RATE: 47 BPM | BODY MASS INDEX: 27.34 KG/M2 | RESPIRATION RATE: 18 BRPM | HEIGHT: 68 IN | SYSTOLIC BLOOD PRESSURE: 164 MMHG | TEMPERATURE: 97 F

## 2024-06-11 DIAGNOSIS — D64.9 ANEMIA, UNSPECIFIED TYPE: ICD-10-CM

## 2024-06-11 DIAGNOSIS — C61 PROSTATE CANCER (HCC): Primary | ICD-10-CM

## 2024-06-11 DIAGNOSIS — C43.9 MALIGNANT MELANOMA, UNSPECIFIED SITE (HCC): ICD-10-CM

## 2024-06-11 PROCEDURE — 99214 OFFICE O/P EST MOD 30 MIN: CPT | Performed by: STUDENT IN AN ORGANIZED HEALTH CARE EDUCATION/TRAINING PROGRAM

## 2024-06-12 NOTE — PROGRESS NOTES
Cancer Center Progress Note    Patient Name: Yordy Diaz   YOB: 1935   Medical Record Number: [id number]     Chief Complaint:  Melanoma, prostate cancer     History of Present Illness:  Cancer history:  89 year old   male with a history of localized prostate cancer status post external beam radiotherapy and ADT for clinical stage TIIIb disease as well as androgen deprivation therapy in May 2019 with Dr. Negrito Dickinson, and melanoma and melanoma in situ.  The patient was seen by Dermatology and then Plastic Surgery with Dr. Yordy Sullivan.  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 Yordy Sullivan.  With regard to the invasive melanoma, this was 0.62 mm thick with mitotic rate of 1 per mm squared.  There was no ulceration.    Also has a history of mild leukopenia and anemia    Interval History:  The patient returns for routine follow-up is doing reasonable overall denies any fevers or chills denies any skin changes denies any change in urinary habits      Performance Status:  ECOG 0  Past Medical History:  Past Medical History:    Back disorder    per NG: \"lower disc disease\"    BPH (benign prostatic hyperplasia)    has been on doxaosin    CAD (coronary artery disease)    medication    Cancer (HCC)    Exposure to medical diagnostic radiation    Hearing impairment    bilateral hearing aids    High blood pressure    High cholesterol    Hypercholesterolemia    Hyperlipidemia    Hypertension    Inguinal hernia    Leukopenia    Lumbar disc disease    epidural back injection    Prostate cancer (HCC)    [place] 4+5=9    Visual impairment       Past Surgical History:  Past Surgical History:    Procedure Laterality Date    Back surgery  1989, 2001    x2     Hernia surgery  1990    inguinal hernia    Other Right     Carpal Tunnel Surgery    Other Left     Carpal Tunnel  Surgey    Other surgical history Right 2012    right hand surgery    Other surgical history  10/14/2019    Melanoma removal     Patient documented not to have experienced any of the following events  12/23/2015    Procedure: ;  Surgeon: Lukas Leone MD;  Location: [facility]    Patient documented not to have experienced any of the following events  1/5/2016    Procedure: ;  Surgeon: Lukas Leone MD;  Location: [facility]    Patient withough preoperative order for iv antibiotic surgical site infection prophylaxis.  12/23/2015    Procedure: ;  Surgeon: Lukas Leone MD;  Location: [facility]    Patient withough preoperative order for iv antibiotic surgical site infection prophylaxis.  1/5/2016    Procedure: ;  Surgeon: Lukas Leone MD;  Location: [facility]    Revise median n/carpal tunnel surg Left 12/23/2015    Procedure: CARPAL TUNNEL RELEASE;  Surgeon: Lukas Leone MD;  Location: [facility]    Revise median n/carpal tunnel surg Right 1/5/2016    Procedure: CARPAL TUNNEL RELEASE;  Surgeon: Lukas Leone MD;  Location: [facility]    Us biopsy prostate (cpt=76942/34726) Bilateral 06/19/2018    [place] 4+5=9       Family History:  Family History   Problem Relation Age of Onset    Other (Natural causes) Mother 94    Alcohol and Other Disorders Associated Father         alcoholism    Other (Lung Cancer) Father 63    Breast Cancer Sister     Breast Cancer Paternal Grandmother        Social History:  Social History     Socioeconomic History    Marital status:    Tobacco Use    Smoking status: Never    Smokeless tobacco: Never   Vaping Use    Vaping status: Never Used   Substance and Sexual Activity    Alcohol use: Yes     Comment: daily    Drug use: No   Other Topics Concern    Caffeine Concern Yes      Comment: 2-3 cup of coffee daily         Current Medications:    Current Outpatient Medications:     DOXAZOSIN 4 MG Oral Tab, TAKE 1 TABLET BY MOUTH IN THE MORNING, Disp: 90 tablet, Rfl: 3    Ergocalciferol (VITAMIN D OR), Take 2,500 mg by mouth daily., Disp: , Rfl:     acetaminophen 500 MG Oral Tab, Take 2 tablets (1,000 mg total) by mouth in the morning and 2 tablets (1,000 mg total) before bedtime., Disp: , Rfl:     folic acid 1 MG Oral Tab, Take 1 tablet (1 mg total) by mouth daily., Disp: 90 tablet, Rfl: 3    GABAPENTIN 600 MG Oral Tab, TAKE ONE TABLET BY MOUTH ONE TIME DAILY  (Patient taking differently: Take 2 tablets (1,200 mg total) by mouth daily.), Disp: 90 tablet, Rfl: 0    atorvastatin 40 MG Oral Tab, atorvastatin 40 mg tablet  Take 1 tablet every day by oral route., Disp: , Rfl:     DiphenhydrAMINE HCl (BENADRYL) 25 MG Oral Cap, Take 1 capsule (25 mg total) by mouth every 6 (six) hours as needed for Itching., Disp: , Rfl:     Metoprolol Tartrate (LOPRESSOR) 50 MG Oral Tab, Take 1 tablet by mouth daily., Disp: 90 tablet, Rfl: 0    aspirin 81 MG Oral Tab, Take 1 tablet (81 mg total) by mouth daily., Disp: , Rfl:     Current Outpatient Medications on File Prior to Visit   Medication Sig Dispense Refill    DOXAZOSIN 4 MG Oral Tab TAKE 1 TABLET BY MOUTH IN THE MORNING 90 tablet 3    Ergocalciferol (VITAMIN D OR) Take 2,500 mg by mouth daily.      acetaminophen 500 MG Oral Tab Take 2 tablets (1,000 mg total) by mouth in the morning and 2 tablets (1,000 mg total) before bedtime.      folic acid 1 MG Oral Tab Take 1 tablet (1 mg total) by mouth daily. 90 tablet 3    GABAPENTIN 600 MG Oral Tab TAKE ONE TABLET BY MOUTH ONE TIME DAILY  (Patient taking differently: Take 2 tablets (1,200 mg total) by mouth daily.) 90 tablet 0    atorvastatin 40 MG Oral Tab atorvastatin 40 mg tablet   Take 1 tablet every day by oral route.      DiphenhydrAMINE HCl (BENADRYL) 25 MG Oral Cap Take 1 capsule (25 mg total) by mouth every  6 (six) hours as needed for Itching.      Metoprolol Tartrate (LOPRESSOR) 50 MG Oral Tab Take 1 tablet by mouth daily. 90 tablet 0    aspirin 81 MG Oral Tab Take 1 tablet (81 mg total) by mouth daily.       No current facility-administered medications on file prior to visit.         Allergies:  No Known Allergies     Review of Systems:  All other systems reviewed and negative x12    Vital Signs:  BP (!) 164/71 (BP Location: Left arm, Patient Position: Sitting, Cuff Size: adult)   Pulse (!) 47   Temp 97.3 °F (36.3 °C) (Oral)   Resp 18   Ht 1.727 m (5' 8\")   Wt 81.8 kg (180 lb 6.4 oz)   SpO2 96%   BMI 27.43 kg/m²     Physical Examination:  General: Patient is alert and oriented x 3, not in acute distress.  Psych:  Mood and affect appropriate  HEENT: EOMs intact. PERRL. Oropharynx is clear.   Neck: No JVD. No palpable lymphadenopathy. Neck is supple.  Lymphatics: There is no palpable peripheral lymphadenopathy   Chest: Symmetric expansion, nonlabored breathing  Cardiovascular: Regular with palpable distal pulses   Abdomen: Soft, non tender.   Extremities: No edema.  Neurological: 5/5 motor x4.        Laboratory:  WBC: 4.6, done on 6/10/2024.  HGB: 12.3, done on 6/10/2024.  PLT: 197, done on 6/10/2024.           Lab Results   Component Value Date    GLU 94 06/10/2024    BUN 20 06/10/2024    BUNCREA 18.9 06/10/2024    CREATSERUM 1.06 06/10/2024    ANIONGAP 4 06/10/2024    GFRNAA 68 05/09/2022    GFRAA 79 05/09/2022    CA 9.9 06/10/2024    OSMOCALC 296 (H) 06/10/2024    ALKPHO 74 06/10/2024    AST 23 06/10/2024    ALT 16 06/10/2024    ALKPHOS 64 08/11/2016    BILT 1.1 06/10/2024    TP 6.7 06/10/2024    ALB 4.3 06/10/2024    GLOBULIN 2.4 06/10/2024    AGRATIO 1.4 08/11/2016     06/10/2024    K 4.8 06/10/2024     06/10/2024    CO2 28.0 06/10/2024       Radiology:  none     Cancer Staging   No matching staging information was found for the patient.      Impression and Plan:  89 year old   male with a  history of localized prostate cancer status post external beam radiotherapy and ADT for clinical stage TIIIb disease May 2019 with Dr. Negrito Dickinson, and melanoma and melanoma in situ.  -Treatment as outlined above  -No evidence of recurrence, PSADT around 20 months, 0.21 today, continue to monitor   -Chronic leukopenia and  macrocytic anemia b12 and iron adequate.        Bone marrow biopsy 2015 without evidence of MDS or other clonal process    RTC 12 months    Data: Moderate cbc metabolic panel primary care physician notes    Gigi Gallardo,

## 2024-08-15 ENCOUNTER — OFFICE VISIT (OUTPATIENT)
Dept: OTOLARYNGOLOGY | Facility: CLINIC | Age: 89
End: 2024-08-15

## 2024-08-15 VITALS — HEIGHT: 68 IN | BODY MASS INDEX: 27.28 KG/M2 | WEIGHT: 180 LBS

## 2024-08-15 DIAGNOSIS — H61.23 BILATERAL IMPACTED CERUMEN: Primary | ICD-10-CM

## 2024-08-15 PROCEDURE — 69210 REMOVE IMPACTED EAR WAX UNI: CPT | Performed by: OTOLARYNGOLOGY

## 2024-08-15 NOTE — PROGRESS NOTES
Yordy Diaz is a 89 year old male.    Chief Complaint   Patient presents with    Ear Wax     Ear cleaning       HISTORY OF PRESENT ILLNESS    Patient presents for cerumen removal. No other complaints or concerns at this time    Social History     Socioeconomic History    Marital status:    Tobacco Use    Smoking status: Never    Smokeless tobacco: Never   Vaping Use    Vaping status: Never Used   Substance and Sexual Activity    Alcohol use: Yes     Comment: daily    Drug use: No   Other Topics Concern    Caffeine Concern Yes     Comment: 2-3 cup of coffee daily       Family History   Problem Relation Age of Onset    Other (Natural causes) Mother 94    Alcohol and Other Disorders Associated Father         alcoholism    Other (Lung Cancer) Father 63    Breast Cancer Sister     Breast Cancer Paternal Grandmother        Past Medical History:    Back disorder    per NG: \"lower disc disease\"    BPH (benign prostatic hyperplasia)    has been on doxaosin    CAD (coronary artery disease)    medication    Cancer (HCC)    Exposure to medical diagnostic radiation    Hearing impairment    bilateral hearing aids    High blood pressure    High cholesterol    Hypercholesterolemia    Hyperlipidemia    Hypertension    Inguinal hernia    Leukopenia    Lumbar disc disease    epidural back injection    Prostate cancer (HCC)    [place] 4+5=9    Visual impairment       Past Surgical History:   Procedure Laterality Date    Back surgery  1989, 2001    x2     Hernia surgery  1990    inguinal hernia    Other Right     Carpal Tunnel Surgery    Other Left     Carpal Tunnel  Surgey    Other surgical history Right 2012    right hand surgery    Other surgical history  10/14/2019    Melanoma removal     Patient documented not to have experienced any of the following events  12/23/2015    Procedure: ;  Surgeon: Lukas Leone MD;  Location: [facility]    Patient documented not to have experienced any of the following events  1/5/2016     Procedure: ;  Surgeon: Lukas Leone MD;  Location: [facility]    Patient withough preoperative order for iv antibiotic surgical site infection prophylaxis.  12/23/2015    Procedure: ;  Surgeon: Lukas Leone MD;  Location: [facility]    Patient withough preoperative order for iv antibiotic surgical site infection prophylaxis.  1/5/2016    Procedure: ;  Surgeon: Lukas Leone MD;  Location: [facility]    Revise median n/carpal tunnel surg Left 12/23/2015    Procedure: CARPAL TUNNEL RELEASE;  Surgeon: Lukas Leone MD;  Location: [facility]    Revise median n/carpal tunnel surg Right 1/5/2016    Procedure: CARPAL TUNNEL RELEASE;  Surgeon: Lukas Leone MD;  Location: [facility]    Us biopsy prostate (cpt=[id number]) Bilateral 06/19/2018    [place] 4+5=9       REVIEW OF SYSTEMS    System Neg/Pos Details   Constitutional Negative Fatigue, fever and weight loss.   ENMT Negative Drooling.   Eyes Negative Blurred vision and vision changes.   Respiratory Negative Dyspnea and wheezing.   Cardio Negative Chest pain, irregular heartbeat/palpitations and syncope.   GI Negative Abdominal pain and diarrhea.   Endocrine Negative Cold intolerance and heat intolerance.   Neuro Negative Tremors.   Psych Negative Anxiety and depression.   Integumentary Negative Frequent skin infections, pigment change and rash.   Hema/Lymph Negative Easy bleeding and easy bruising.           PHYSICAL EXAM    Ht 5' 8\" (1.727 m)   Wt 180 lb (81.6 kg)   BMI 27.37 kg/m²        Constitutional Normal Overall appearance - Normal.        Neck Exam Normal Inspection - Normal. Palpation - Normal. Parotid gland - Normal. Thyroid gland - Normal.             Head/Face Normal Facial features - Normal. Eyebrows - Normal. Skull - Normal.             Ears Normal Inspection - Right: Normal, Left: Normal. Canal - Right: Normal, Left: Normal. TM - Right: Normal, Left: Normal.   Skin Normal Inspection - Normal.                               Canals:  Right: Canal reveals cerumen impaction,   Left: Canal reveals cerumen impaction,     Tympanic Membranes:  Right: Normal tympanic membrane.   Left: Normal tympanic membrane.     TM Visualized Method:   Right TM examined via otomicroscopy.    Left TM examined via otomicroscopy.      PROCEDURE:    Removal of cerumen impaction   The cerumen impaction was completely removed using microscopy.   Removal was completed by using acurette and/or suction.   Comments: Return to clinic as needed.  Avoid q-tips, water precautions and use over the counter wax remedies as needed.      Current Outpatient Medications:     DOXAZOSIN 4 MG Oral Tab, TAKE 1 TABLET BY MOUTH IN THE MORNING, Disp: 90 tablet, Rfl: 3    Ergocalciferol (VITAMIN D OR), Take 2,500 mg by mouth daily., Disp: , Rfl:     acetaminophen 500 MG Oral Tab, Take 2 tablets (1,000 mg total) by mouth in the morning and 2 tablets (1,000 mg total) before bedtime., Disp: , Rfl:     folic acid 1 MG Oral Tab, Take 1 tablet (1 mg total) by mouth daily., Disp: 90 tablet, Rfl: 3    GABAPENTIN 600 MG Oral Tab, TAKE ONE TABLET BY MOUTH ONE TIME DAILY  (Patient taking differently: Take 2 tablets (1,200 mg total) by mouth daily.), Disp: 90 tablet, Rfl: 0    atorvastatin 40 MG Oral Tab, atorvastatin 40 mg tablet  Take 1 tablet every day by oral route., Disp: , Rfl:     DiphenhydrAMINE HCl (BENADRYL) 25 MG Oral Cap, Take 1 capsule (25 mg total) by mouth every 6 (six) hours as needed for Itching., Disp: , Rfl:     Metoprolol Tartrate (LOPRESSOR) 50 MG Oral Tab, Take 1 tablet by mouth daily., Disp: 90 tablet, Rfl: 0    aspirin 81 MG Oral Tab, Take 1 tablet (81 mg total) by mouth daily., Disp: , Rfl:   ASSESSMENT AND PLAN    1. Bilateral impacted cerumen        All cerumen was removed using microscopy. I have asked the patient to return to see me as needed for repeat cerumen removal in the future.      Jose Friedman MD    8/15/2024    3:06 PM

## 2024-09-30 ENCOUNTER — LAB ENCOUNTER (OUTPATIENT)
Dept: LAB | Facility: HOSPITAL | Age: 89
End: 2024-09-30
Attending: UROLOGY
Payer: MEDICARE

## 2024-09-30 DIAGNOSIS — N13.9 OBSTRUCTION, UROPATHY: ICD-10-CM

## 2024-09-30 DIAGNOSIS — C61 PROSTATE CANCER (HCC): ICD-10-CM

## 2024-09-30 LAB
CREAT BLD-MCNC: 1.07 MG/DL
EGFRCR SERPLBLD CKD-EPI 2021: 67 ML/MIN/1.73M2 (ref 60–?)
PSA SERPL-MCNC: 0.37 NG/ML (ref ?–4)

## 2024-09-30 PROCEDURE — 36415 COLL VENOUS BLD VENIPUNCTURE: CPT

## 2024-09-30 PROCEDURE — 82565 ASSAY OF CREATININE: CPT

## 2024-09-30 PROCEDURE — 84153 ASSAY OF PSA TOTAL: CPT

## 2024-11-18 ENCOUNTER — COMPREHENSIVE EXAM (OUTPATIENT)
Dept: URBAN - METROPOLITAN AREA CLINIC 32 | Facility: CLINIC | Age: 89
End: 2024-11-18

## 2024-11-18 DIAGNOSIS — H16.223: ICD-10-CM

## 2024-11-18 DIAGNOSIS — H35.033: ICD-10-CM

## 2024-11-18 DIAGNOSIS — H40.1113: ICD-10-CM

## 2024-11-18 DIAGNOSIS — H40.1122: ICD-10-CM

## 2024-11-18 PROCEDURE — 99214 OFFICE O/P EST MOD 30 MIN: CPT

## 2024-11-18 PROCEDURE — 92015 DETERMINE REFRACTIVE STATE: CPT

## 2024-11-18 PROCEDURE — 92133 CPTRZD OPH DX IMG PST SGM ON: CPT

## 2025-04-10 ENCOUNTER — FOLLOW UP (OUTPATIENT)
Age: 89
End: 2025-04-10

## 2025-04-10 DIAGNOSIS — H40.1122: ICD-10-CM

## 2025-04-10 DIAGNOSIS — H40.1113: ICD-10-CM

## 2025-04-10 PROCEDURE — 92083 EXTENDED VISUAL FIELD XM: CPT

## 2025-04-10 PROCEDURE — 99213 OFFICE O/P EST LOW 20 MIN: CPT

## 2025-04-10 RX ORDER — LATANOPROST 50 UG/ML: 1 SOLUTION/ DROPS OPHTHALMIC EVERY EVENING

## 2025-05-06 ENCOUNTER — LAB ENCOUNTER (OUTPATIENT)
Dept: LAB | Facility: HOSPITAL | Age: 89
End: 2025-05-06
Attending: STUDENT IN AN ORGANIZED HEALTH CARE EDUCATION/TRAINING PROGRAM
Payer: MEDICARE

## 2025-05-06 DIAGNOSIS — C43.9 MALIGNANT MELANOMA, UNSPECIFIED SITE (HCC): ICD-10-CM

## 2025-05-06 DIAGNOSIS — C61 PROSTATE CANCER (HCC): ICD-10-CM

## 2025-05-06 LAB
ALBUMIN SERPL-MCNC: 4.3 G/DL (ref 3.2–4.8)
ALBUMIN/GLOB SERPL: 1.7 {RATIO} (ref 1–2)
ALP LIVER SERPL-CCNC: 85 U/L (ref 45–117)
ALT SERPL-CCNC: 20 U/L (ref 10–49)
ANION GAP SERPL CALC-SCNC: 7 MMOL/L (ref 0–18)
AST SERPL-CCNC: 31 U/L (ref ?–34)
BASOPHILS # BLD AUTO: 0.03 X10(3) UL (ref 0–0.2)
BASOPHILS NFR BLD AUTO: 0.6 %
BILIRUB SERPL-MCNC: 1.2 MG/DL (ref 0.2–1.1)
BUN BLD-MCNC: 17 MG/DL (ref 9–23)
BUN/CREAT SERPL: 14.8 (ref 10–20)
CALCIUM BLD-MCNC: 9.7 MG/DL (ref 8.7–10.4)
CHLORIDE SERPL-SCNC: 103 MMOL/L (ref 98–112)
CO2 SERPL-SCNC: 29 MMOL/L (ref 21–32)
CREAT BLD-MCNC: 1.15 MG/DL (ref 0.7–1.3)
DEPRECATED RDW RBC AUTO: 51.7 FL (ref 35.1–46.3)
EGFRCR SERPLBLD CKD-EPI 2021: 61 ML/MIN/1.73M2 (ref 60–?)
EOSINOPHIL # BLD AUTO: 0.14 X10(3) UL (ref 0–0.7)
EOSINOPHIL NFR BLD AUTO: 2.6 %
ERYTHROCYTE [DISTWIDTH] IN BLOOD BY AUTOMATED COUNT: 13.5 % (ref 11–15)
FASTING STATUS PATIENT QL REPORTED: YES
GLOBULIN PLAS-MCNC: 2.5 G/DL (ref 2–3.5)
GLUCOSE BLD-MCNC: 84 MG/DL (ref 70–99)
HCT VFR BLD AUTO: 36.7 % (ref 39–53)
HGB BLD-MCNC: 11.9 G/DL (ref 13–17.5)
IMM GRANULOCYTES # BLD AUTO: 0.02 X10(3) UL (ref 0–1)
IMM GRANULOCYTES NFR BLD: 0.4 %
LYMPHOCYTES # BLD AUTO: 1.29 X10(3) UL (ref 1–4)
LYMPHOCYTES NFR BLD AUTO: 24.2 %
MCH RBC QN AUTO: 33.3 PG (ref 26–34)
MCHC RBC AUTO-ENTMCNC: 32.4 G/DL (ref 31–37)
MCV RBC AUTO: 102.8 FL (ref 80–100)
MONOCYTES # BLD AUTO: 0.61 X10(3) UL (ref 0.1–1)
MONOCYTES NFR BLD AUTO: 11.4 %
NEUTROPHILS # BLD AUTO: 3.25 X10 (3) UL (ref 1.5–7.7)
NEUTROPHILS # BLD AUTO: 3.25 X10(3) UL (ref 1.5–7.7)
NEUTROPHILS NFR BLD AUTO: 60.8 %
OSMOLALITY SERPL CALC.SUM OF ELEC: 289 MOSM/KG (ref 275–295)
PLATELET # BLD AUTO: 243 10(3)UL (ref 150–450)
POTASSIUM SERPL-SCNC: 5 MMOL/L (ref 3.5–5.1)
PROT SERPL-MCNC: 6.8 G/DL (ref 5.7–8.2)
PSA SERPL-MCNC: 0.86 NG/ML (ref ?–4)
RBC # BLD AUTO: 3.57 X10(6)UL (ref 3.8–5.8)
SODIUM SERPL-SCNC: 139 MMOL/L (ref 136–145)
WBC # BLD AUTO: 5.3 X10(3) UL (ref 4–11)

## 2025-05-06 PROCEDURE — 84153 ASSAY OF PSA TOTAL: CPT

## 2025-05-06 PROCEDURE — 85025 COMPLETE CBC W/AUTO DIFF WBC: CPT

## 2025-05-06 PROCEDURE — 80053 COMPREHEN METABOLIC PANEL: CPT

## 2025-05-06 PROCEDURE — 36415 COLL VENOUS BLD VENIPUNCTURE: CPT

## 2025-06-10 ENCOUNTER — OFFICE VISIT (OUTPATIENT)
Age: 89
End: 2025-06-10
Attending: STUDENT IN AN ORGANIZED HEALTH CARE EDUCATION/TRAINING PROGRAM
Payer: MEDICARE

## 2025-06-10 VITALS
WEIGHT: 171 LBS | OXYGEN SATURATION: 99 % | HEIGHT: 68 IN | RESPIRATION RATE: 18 BRPM | SYSTOLIC BLOOD PRESSURE: 155 MMHG | DIASTOLIC BLOOD PRESSURE: 76 MMHG | TEMPERATURE: 99 F | HEART RATE: 50 BPM | BODY MASS INDEX: 25.91 KG/M2

## 2025-06-10 DIAGNOSIS — Z08 ENCOUNTER FOR FOLLOW-UP SURVEILLANCE OF PROSTATE CANCER: ICD-10-CM

## 2025-06-10 DIAGNOSIS — C61 PROSTATE CANCER (HCC): Primary | ICD-10-CM

## 2025-06-10 DIAGNOSIS — Z85.46 ENCOUNTER FOR FOLLOW-UP SURVEILLANCE OF PROSTATE CANCER: ICD-10-CM

## 2025-06-11 NOTE — PROGRESS NOTES
Cancer Center Progress Note    Patient Name: Yordy Diaz   YOB: 1935   Medical Record Number: [id number]     Chief Complaint:  Melanoma, prostate cancer     History of Present Illness:  Cancer history:  89 year old   male with a history of localized prostate cancer status post external beam radiotherapy and ADT for clinical stage TIIIb disease as well as androgen deprivation therapy in May 2019 with Dr. Negrito Dickinson, and melanoma and melanoma in situ.  The patient was seen by Dermatology and then Plastic Surgery with Dr. Yordy Sullivan.  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 Yordy Sullivan.  With regard to the invasive melanoma, this was 0.62 mm thick with mitotic rate of 1 per mm squared.  There was no ulceration.    Also has a history of mild leukopenia and anemia    Interval History:  The patient returns for routine follow-up is doing reasonable overall denies any fevers or chills denies any skin changes denies any change in urinary habits    Current Medications:  Current Outpatient Medications:     DOXAZOSIN 4 MG Oral Tab, TAKE 1 TABLET BY MOUTH IN THE MORNING, Disp: 90 tablet, Rfl: 3    Ergocalciferol (VITAMIN D OR), Take 2,500 mg by mouth in the morning., Disp: , Rfl:     acetaminophen 500 MG Oral Tab, Take 2 tablets (1,000 mg total) by mouth in the morning and 2 tablets (1,000 mg total) before bedtime., Disp: , Rfl:     folic acid 1 MG Oral Tab, Take 1 tablet (1 mg total) by mouth daily., Disp: 90 tablet, Rfl: 3    GABAPENTIN 600 MG Oral Tab, TAKE ONE TABLET BY MOUTH ONE TIME DAILY  (Patient taking differently: Take 2 tablets (1,200 mg total) by mouth in the morning.),  Disp: 90 tablet, Rfl: 0    atorvastatin 40 MG Oral Tab, , Disp: , Rfl:     DiphenhydrAMINE HCl (BENADRYL) 25 MG Oral Cap, Take 1 capsule (25 mg total) by mouth every 6 (six) hours as needed for Itching., Disp: , Rfl:     Metoprolol Tartrate (LOPRESSOR) 50 MG Oral Tab, Take 1 tablet by mouth daily., Disp: 90 tablet, Rfl: 0    aspirin 81 MG Oral Tab, Take 1 tablet (81 mg total) by mouth in the morning., Disp: , Rfl:     Current Outpatient Medications on File Prior to Visit   Medication Sig Dispense Refill    DOXAZOSIN 4 MG Oral Tab TAKE 1 TABLET BY MOUTH IN THE MORNING 90 tablet 3    Ergocalciferol (VITAMIN D OR) Take 2,500 mg by mouth in the morning.      acetaminophen 500 MG Oral Tab Take 2 tablets (1,000 mg total) by mouth in the morning and 2 tablets (1,000 mg total) before bedtime.      folic acid 1 MG Oral Tab Take 1 tablet (1 mg total) by mouth daily. 90 tablet 3    GABAPENTIN 600 MG Oral Tab TAKE ONE TABLET BY MOUTH ONE TIME DAILY  (Patient taking differently: Take 2 tablets (1,200 mg total) by mouth in the morning.) 90 tablet 0    atorvastatin 40 MG Oral Tab       DiphenhydrAMINE HCl (BENADRYL) 25 MG Oral Cap Take 1 capsule (25 mg total) by mouth every 6 (six) hours as needed for Itching.      Metoprolol Tartrate (LOPRESSOR) 50 MG Oral Tab Take 1 tablet by mouth daily. 90 tablet 0    aspirin 81 MG Oral Tab Take 1 tablet (81 mg total) by mouth in the morning.       No current facility-administered medications on file prior to visit.     Allergies:  No Known Allergies     Review of Systems:  All other systems reviewed and negative x12    Vital Signs:  /76 (BP Location: Left arm, Patient Position: Sitting, Cuff Size: adult)   Pulse 50   Temp 98.5 °F (36.9 °C) (Oral)   Resp 18   Ht 1.727 m (5' 8\")   Wt 77.6 kg (171 lb)   SpO2 99%   BMI 26.00 kg/m²     Physical Examination:  General: Patient is alert and oriented x 3, not in acute distress.  Psych:  Mood and affect appropriate  HEENT: EOMs intact.  PERRL. Oropharynx is clear.   Chest: Symmetric expansion, nonlabored breathing  Cardiovascular: Regular with palpable distal pulses   Abdomen: Soft, non tender.   Extremities: No edema.  Neurological: 5/5 motor x4.      Laboratory:  WBC: 5.3, done on 5/6/2025.  HGB: 11.9, done on 5/6/2025.  PLT: 243, done on 5/6/2025.       Lab Results   Component Value Date    GLU 84 05/06/2025    BUN 17 05/06/2025    BUNCREA 14.8 05/06/2025    CREATSERUM 1.15 05/06/2025    ANIONGAP 7 05/06/2025    GFRNAA 68 05/09/2022    GFRAA 79 05/09/2022    CA 9.7 05/06/2025    OSMOCALC 289 05/06/2025    ALKPHO 85 05/06/2025    AST 31 05/06/2025    ALT 20 05/06/2025    ALKPHOS 64 08/11/2016    BILT 1.2 (H) 05/06/2025    TP 6.8 05/06/2025    ALB 4.3 05/06/2025    GLOBULIN 2.5 05/06/2025    AGRATIO 1.4 08/11/2016     05/06/2025    K 5.0 05/06/2025     05/06/2025    CO2 29.0 05/06/2025     Impression and Plan:  89 year old male with a history of localized prostate cancer status post external beam radiotherapy and ADT for clinical stage TIIIb disease May 2019, and melanoma and melanoma in situ.  -Treatment as outlined above  -PSA trajectory slightly increased but does not meet criteria for recurrence per GAIL   -Chronic leukopenia and  macrocytic anemia b12 and iron adequate.        Bone marrow biopsy 2015 without evidence of MDS or other clonal process    RTC 12 months    Data: Moderate cbc metabolic panel primary care physician notes    Gigi Gallardo DO

## 2025-06-25 ENCOUNTER — TELEPHONE (OUTPATIENT)
Dept: NEUROLOGY | Facility: CLINIC | Age: 89
End: 2025-06-25

## 2025-06-25 NOTE — TELEPHONE ENCOUNTER
Pt called back about the cancellation of his 7/2 apt at 3pm. Pt was offered 6/30 but stated he has a golf outing on that day. He mentioned Mondays and Wednesdays are not good days for him. Please advise where to r/s

## 2025-07-23 NOTE — TELEPHONE ENCOUNTER
Continued Stay SW/CM Assessment/Plan of Care Note         Active Substitute Decision Maker (SDM)       JIMY HENDRICKS Health Care Agent 1 - Spouse   Primary Phone: [phone] (Home)  Mobile Phone: [phone]             RYAN HENDRICKS Health Care Agent 2 - Son   Primary Phone: [phone] (Mobile)                     Progress note:  Chart reviewed, HAIDER following for discharge planning.    HAIDER communicated with Sarah Angelo at [facility]. Gi is waiting for final approval from facility medical director to start insurance auth and will update HAIDER when that is started.    ADDENDUM 1864  HAIDER spoke with Admissions Gi who informed SW that facility can no longer accept pt.    HAIDER followed up with existing facility referrals. [facility] can still clinically accept pt. HAIDER left VM with [facility] to inquire about ability to accept pt.    HAIDER called and spoke with pt son Ryan, informed him of the above updates. HAIDER sent referral to [facility] at Ryan's request. Pt daughter is visiting some facilities tonight and family will make a decision by Friday 7/25/25.    ADDENDUM 0212  HAIDER provided APOA contact info to Admissions Blaise ([phone]) at [facility] for facility to evaluate long term family support plan.    HAIDER will continue to follow and update as appropriate.    Current Patient Status: Inpatient    See SW/CM flowsheets for other objective data.    Disposition Recommendations:  Preliminary discharge destination: Planned Discharge Destination: Rehabilitation/Skilled Care  SW/CM recommendation for discharge: Sub-acute nursing home    Destination Pharmacy: [facility] [place], [place] - [address] [place] [address] Pharmacy confirmed with facility, Preferred pharmacy updated    Discharge Plan/Needs:     Continued Care and Services - Admitted Since 6/24/2025       Destination        Service Provider Request Status Services Address Phone Fax    [facility] -  From: Danielle Hernandez  To: Vargas Goldstein MD  Sent: 6/8/2018 9:06 PM CDT  Subject: Other    I have a yearly appointment on Monday July 9th.  Do you want me to have the same blood testing that has been done in the previous years and if so I will need a SNF PAN Accepted -- [address] [facility] [address] [phone]    [facility] SNF PAN Accepted -- [address] [facility] [address] [phone]    [facility] - IP Pending - Request Sent -- [address] [address] [phone]    [facility] Pending - Request Sent -- [address] [facility] [address] [phone]    [facility] Pending - Request Sent -- [address] [facility] [id number] [phone]    [facility] BECKER Declined  Bed not available, Acuity too high -- [address] [facility] [address] [phone]    [facility] - IP REHAB Declined  Patient does not meet the level of care required for admission -- [address] [facility] [address] [phone]                  Continued Care and Services - Linked Episodes Includes continued care and service providers from the active episodes linked to this encounter, which are listed below      Care Transitions Episode start date: 6/26/2025   There are no active outsourced providers for this episode.                     Devices/ Equipment that need to be arranged for discharge:     Accepted   Pending insurance authorization   Others:    Anticipated date of DME availability:     Prior To Hospitalization:    Living Situation: Other facility residents and residing at Other (comment) ([facility])    .  Support Systems: Family members   Home Devices/Equipment: Home Oxygen (T)            Mobility Assist Devices: Standard walker   Type of Service Prior to Hospitalization: None               Patient/Family discharge goal (s):  Sub-acute nursing home     Resources provided:           Prior Function  Bed Mobility: Independent (06/25/25 0800 : Sofia Ng, OT)  Transfers:  Independent (06/25/25 0800 : Sofia Ng OT)  Ambulation in the Home: Independent (06/25/25 0800 : Sofia Ng OT)  Ambulation in the Community: Independent (06/25/25 0800 : Sofia Ng OT)    Current Function  Last Filed Values         Value Time User    Current Function  significantly below baseline level of function 7/22/2025  5:05 PM Yadira Melchor, PT    Therapy Impairments  activity tolerance; balance; strength; safety awareness 7/22/2025  5:05 PM Yadira Melchor, PT    ADLs Requiring Support  bed mobility; transfers; ambulation; stairs 7/22/2025  5:05 PM Yadira Melchor, PT            Therapy Recommendations for Discharge:   PT:      Last Filed Values         Value Time User    PT Discharge Needs  therapy 5 or more times per week 7/22/2025  5:05 PM Yadira Melchor, PT          OT:      Last Filed Values         Value Time User    OT Discharge Needs  intensive daily therapy 7/22/2025  9:51 AM Jaime Fitzgerald OT          SLP:    Last Filed Values         Value Time User    SLP Discharge Needs  therapy 1-3 times per week 7/14/2025 11:06 AM Karena Garrett, [place]            Mobility Equipment Recommended for Discharge: continue to assess      Barriers to Discharge  Identified Barriers to Discharge/Transition Planning:   Clinical barriers: Rehab plan of care obstacle   External barriers: Facility, Payer/Authorization   Psychosocial barriers: None

## 2025-08-21 ENCOUNTER — OFFICE VISIT (OUTPATIENT)
Dept: NEUROLOGY | Facility: CLINIC | Age: 89
End: 2025-08-21

## 2025-08-21 VITALS — HEART RATE: 48 BPM | SYSTOLIC BLOOD PRESSURE: 108 MMHG | DIASTOLIC BLOOD PRESSURE: 55 MMHG | OXYGEN SATURATION: 96 %

## 2025-08-21 DIAGNOSIS — G62.9 NEUROPATHY: Primary | ICD-10-CM

## 2025-08-21 PROCEDURE — G2211 COMPLEX E/M VISIT ADD ON: HCPCS | Performed by: INTERNAL MEDICINE

## 2025-08-21 PROCEDURE — 99204 OFFICE O/P NEW MOD 45 MIN: CPT | Performed by: INTERNAL MEDICINE

## 2025-08-22 ENCOUNTER — LAB ENCOUNTER (OUTPATIENT)
Dept: LAB | Facility: HOSPITAL | Age: 89
End: 2025-08-22
Attending: INTERNAL MEDICINE

## 2025-08-22 DIAGNOSIS — G62.9 NEUROPATHY: ICD-10-CM

## 2025-08-22 LAB — VIT B12 SERPL-MCNC: 371 PG/ML (ref 211–911)

## 2025-08-22 PROCEDURE — 83521 IG LIGHT CHAINS FREE EACH: CPT

## 2025-08-22 PROCEDURE — 82607 VITAMIN B-12: CPT

## 2025-08-22 PROCEDURE — 36415 COLL VENOUS BLD VENIPUNCTURE: CPT

## 2025-08-22 PROCEDURE — 86334 IMMUNOFIX E-PHORESIS SERUM: CPT

## 2025-08-22 PROCEDURE — 84165 PROTEIN E-PHORESIS SERUM: CPT

## 2025-08-26 LAB
ALBUMIN SERPL ELPH-MCNC: 4.09 G/DL (ref 3.75–5.21)
ALBUMIN/GLOB SERPL: 1.77 (ref 1–2)
ALPHA1 GLOB SERPL ELPH-MCNC: 0.29 G/DL (ref 0.19–0.46)
ALPHA2 GLOB SERPL ELPH-MCNC: 0.71 G/DL (ref 0.48–1.05)
B-GLOBULIN SERPL ELPH-MCNC: 0.66 G/DL (ref 0.68–1.23)
GAMMA GLOB SERPL ELPH-MCNC: 0.65 G/DL (ref 0.62–1.7)
KAPPA LC FREE SER-MCNC: 1.38 MG/DL (ref 0.33–1.94)
KAPPA LC FREE/LAMBDA FREE SER NEPH: 0.87 (ref 0.26–1.65)
LAMBDA LC FREE SERPL-MCNC: 1.57 MG/DL (ref 0.57–2.63)
PROT SERPL-MCNC: 6.4 G/DL (ref 5.7–8.2)

## (undated) NOTE — LETTER
7/9/2018              [address] [facility] [address]         Please provide above named patient with a series of Shingrix vaccines 2-6 months apart for shingles prevention.         Sincerely,          Eloina Thorne Re

## (undated) NOTE — MR AVS SNAPSHOT
[address] [address]  [phone]               Thank you for choosing us for your health care visit with Danielle Greer MD.  We are glad to serve you and happy to provide you with this s If you've recently had a stay at the Hospital you can access your discharge instructions in [employer] by going to Visits < Admission Summaries.  If you've been to the Emergency Department or your doctor's office, you can view your past visit information in My Visit [facility] online at  [email or web address]

## (undated) NOTE — LETTER
12/17/2018              [facility]        [address] [facility] [phone]         To Whom it May Concern,    Please provide Sacha Moreno with a Shingrix vaccine.      Sincerely,    MD Trent Sandy [address]

## (undated) NOTE — ED AVS SNAPSHOT
Arnaldo Krabbe   MRN: [id number]    Department:  [facility] Emergency Department   Date of Visit:  6/21/2018           Disclosure     Insurance plans vary and the physician(s) referred by the ER may not be covered by your plan.  Please contact y within the next three months to obtain basic health screening including reassessment of your blood pressure.     IF THERE IS ANY CHANGE OR WORSENING OF YOUR CONDITION, CALL YOUR PRIMARY CARE PHYSICIAN AT ONCE OR RETURN IMMEDIATELY TO THE EMERGENCY DEPARTMEN

## (undated) NOTE — LETTER
[facility], [place]  Authorization for Invasive Procedures  1.  I hereby authorize Dr. Renee Pope , my physician and whomever may be designated as the doctor's assistant, to perform the following operation and/or procedure:  Cervical an performed for the purposes of advancing medicine, science, and/or education, provided my identity is not revealed. If the procedure has been videotaped, the physician/surgeon will obtain the original videotape.  The hospital will not be responsible for stor My signature below affirms that prior to the time of the procedure, I have explained to the patient and/or his legal representative, the risks and benefits involved in the proposed treatment and any reasonable alternative to the proposed treatment.  I have